# Patient Record
Sex: FEMALE | Race: WHITE | Employment: FULL TIME | URBAN - METROPOLITAN AREA
[De-identification: names, ages, dates, MRNs, and addresses within clinical notes are randomized per-mention and may not be internally consistent; named-entity substitution may affect disease eponyms.]

---

## 2017-01-23 ENCOUNTER — HOSPITAL ENCOUNTER (OUTPATIENT)
Dept: NUCLEAR MEDICINE | Age: 22
Discharge: HOME | End: 2017-01-23
Attending: SURGERY | Admitting: SURGERY

## 2017-01-23 DIAGNOSIS — K31.84 GASTROPARESIS: ICD-10-CM

## 2017-01-23 RX ADMIN — Medication 1.3 MILLICURIE: at 09:36

## 2017-06-26 ENCOUNTER — TELEPHONE (OUTPATIENT)
Dept: SURGERY | Age: 22
End: 2017-06-26

## 2017-06-26 ENCOUNTER — HOSPITAL ENCOUNTER (OUTPATIENT)
Dept: LAB | Age: 22
Discharge: HOME | End: 2017-06-26
Attending: SPECIALIST | Admitting: SPECIALIST

## 2017-06-26 DIAGNOSIS — R11.0 NAUSEA: Primary | ICD-10-CM

## 2017-06-26 LAB
ALBUMIN SERPL-MCNC: 4.9 GM/DL (ref 3.4–5)
ALP BLD-CCNC: 318 IU/L (ref 40–128)
ALT SERPL-CCNC: 790 U/L (ref 10–40)
AMYLASE: 37 U/L (ref 25–115)
ANION GAP SERPL CALCULATED.3IONS-SCNC: 19 MMOL/L (ref 4–16)
AST SERPL-CCNC: 443 IU/L (ref 15–37)
BILIRUB SERPL-MCNC: 5.4 MG/DL (ref 0–1)
BUN BLDV-MCNC: 10 MG/DL (ref 6–23)
CALCIUM SERPL-MCNC: 10.6 MG/DL (ref 8.3–10.6)
CHLORIDE BLD-SCNC: 94 MMOL/L (ref 99–110)
CO2: 24 MMOL/L (ref 21–32)
CREAT SERPL-MCNC: 0.9 MG/DL (ref 0.6–1.1)
GFR AFRICAN AMERICAN: >60 ML/MIN/1.73M2
GFR NON-AFRICAN AMERICAN: >60 ML/MIN/1.73M2
GLUCOSE BLD-MCNC: 89 MG/DL (ref 70–140)
HCT VFR BLD CALC: 49 % (ref 37–47)
HEMOGLOBIN: 17.2 GM/DL (ref 12.5–16)
INR BLD: 0.99 INDEX
LIPASE: 26 IU/L (ref 13–60)
MCH RBC QN AUTO: 31.8 PG (ref 27–31)
MCHC RBC AUTO-ENTMCNC: 35.1 % (ref 32–36)
MCV RBC AUTO: 90.6 FL (ref 78–100)
PDW BLD-RTO: 11.9 % (ref 11.7–14.9)
PLATELET # BLD: 310 K/CU MM (ref 140–440)
PMV BLD AUTO: 11 FL (ref 7.5–11.1)
POTASSIUM SERPL-SCNC: 4.4 MMOL/L (ref 3.5–5.1)
PROTHROMBIN TIME: 11.3 SECONDS (ref 9.12–12.5)
RBC # BLD: 5.41 M/CU MM (ref 4.2–5.4)
SODIUM BLD-SCNC: 137 MMOL/L (ref 135–145)
TOTAL PROTEIN: 8.1 GM/DL (ref 6.4–8.2)
WBC # BLD: 6.2 K/CU MM (ref 4–10.5)

## 2017-06-26 RX ORDER — SCOLOPAMINE TRANSDERMAL SYSTEM 1 MG/1
1 PATCH, EXTENDED RELEASE TRANSDERMAL
Qty: 5 PATCH | Refills: 0 | Status: ON HOLD | OUTPATIENT
Start: 2017-06-26 | End: 2017-06-29 | Stop reason: HOSPADM

## 2017-06-28 ENCOUNTER — HOSPITAL ENCOUNTER (OUTPATIENT)
Dept: GASTROENTEROLOGY | Age: 22
Discharge: HOME | End: 2017-07-27
Attending: SPECIALIST | Admitting: SPECIALIST

## 2017-06-28 PROBLEM — K80.51 CALCULUS OF BILE DUCT WITHOUT CHOLECYSTITIS WITH OBSTRUCTION: Status: ACTIVE | Noted: 2017-06-28

## 2017-06-28 LAB — PREGNANCY TEST URINE, POC: NEGATIVE

## 2019-02-20 ENCOUNTER — OFFICE VISIT (OUTPATIENT)
Dept: INTERNAL MEDICINE CLINIC | Age: 24
End: 2019-02-20
Payer: COMMERCIAL

## 2019-02-20 VITALS
WEIGHT: 181 LBS | OXYGEN SATURATION: 98 % | RESPIRATION RATE: 20 BRPM | SYSTOLIC BLOOD PRESSURE: 110 MMHG | HEART RATE: 111 BPM | DIASTOLIC BLOOD PRESSURE: 70 MMHG | HEIGHT: 69 IN | BODY MASS INDEX: 26.81 KG/M2

## 2019-02-20 DIAGNOSIS — F32.A DEPRESSION, UNSPECIFIED DEPRESSION TYPE: Primary | ICD-10-CM

## 2019-02-20 DIAGNOSIS — K52.9 CHRONIC DIARRHEA: ICD-10-CM

## 2019-02-20 DIAGNOSIS — K21.9 GASTROESOPHAGEAL REFLUX DISEASE WITHOUT ESOPHAGITIS: ICD-10-CM

## 2019-02-20 PROCEDURE — 99203 OFFICE O/P NEW LOW 30 MIN: CPT | Performed by: NURSE PRACTITIONER

## 2019-02-20 RX ORDER — CHOLESTYRAMINE 4 G/9G
2 POWDER, FOR SUSPENSION ORAL 2 TIMES DAILY WITH MEALS
Qty: 30 PACKET | Refills: 1 | Status: SHIPPED | OUTPATIENT
Start: 2019-02-20 | End: 2019-05-23 | Stop reason: SDUPTHER

## 2019-02-20 RX ORDER — CITALOPRAM 10 MG/1
10 TABLET ORAL DAILY
Qty: 30 TABLET | Refills: 2 | Status: SHIPPED | OUTPATIENT
Start: 2019-02-20 | End: 2019-05-23

## 2019-02-20 ASSESSMENT — ENCOUNTER SYMPTOMS
NAUSEA: 0
SINUS PAIN: 0
COUGH: 0
DIARRHEA: 1
ABDOMINAL PAIN: 0
SHORTNESS OF BREATH: 0
APNEA: 0
CHEST TIGHTNESS: 0
SINUS PRESSURE: 0
COLOR CHANGE: 0
VOMITING: 0

## 2019-02-20 ASSESSMENT — PATIENT HEALTH QUESTIONNAIRE - PHQ9
2. FEELING DOWN, DEPRESSED OR HOPELESS: 1
SUM OF ALL RESPONSES TO PHQ9 QUESTIONS 1 & 2: 2
SUM OF ALL RESPONSES TO PHQ QUESTIONS 1-9: 2
DEPRESSION UNABLE TO ASSESS: FUNCTIONAL CAPACITY MOTIVATION LIMITS ACCURACY
SUM OF ALL RESPONSES TO PHQ QUESTIONS 1-9: 2
1. LITTLE INTEREST OR PLEASURE IN DOING THINGS: 1

## 2019-04-16 ENCOUNTER — TELEPHONE (OUTPATIENT)
Dept: INTERNAL MEDICINE CLINIC | Age: 24
End: 2019-04-16

## 2019-04-16 DIAGNOSIS — T75.3XXA MOTION SICKNESS, INITIAL ENCOUNTER: Primary | ICD-10-CM

## 2019-04-16 RX ORDER — MECLIZINE HYDROCHLORIDE 25 MG/1
25 TABLET ORAL 2 TIMES DAILY PRN
Qty: 30 TABLET | Refills: 0 | Status: SHIPPED | OUTPATIENT
Start: 2019-04-16 | End: 2019-04-26

## 2019-04-16 NOTE — TELEPHONE ENCOUNTER
Would recommend patient try Meclizine 25 mg 2-3 times per day as needed. Should help. If not improving over next several days let me know. This was sent to her pharmacy for her just now.

## 2019-04-22 ENCOUNTER — TELEPHONE (OUTPATIENT)
Dept: INTERNAL MEDICINE CLINIC | Age: 24
End: 2019-04-22

## 2019-04-22 DIAGNOSIS — T75.3XXD MOTION SICKNESS, SUBSEQUENT ENCOUNTER: Primary | ICD-10-CM

## 2019-04-22 RX ORDER — PROMETHAZINE HYDROCHLORIDE 25 MG/1
12.5 TABLET ORAL EVERY 8 HOURS PRN
Qty: 20 TABLET | Refills: 0 | Status: SHIPPED | OUTPATIENT
Start: 2019-04-22 | End: 2019-04-29

## 2019-05-23 ENCOUNTER — OFFICE VISIT (OUTPATIENT)
Dept: INTERNAL MEDICINE CLINIC | Age: 24
End: 2019-05-23
Payer: COMMERCIAL

## 2019-05-23 VITALS
WEIGHT: 189.4 LBS | BODY MASS INDEX: 27.97 KG/M2 | OXYGEN SATURATION: 98 % | HEART RATE: 83 BPM | SYSTOLIC BLOOD PRESSURE: 122 MMHG | DIASTOLIC BLOOD PRESSURE: 82 MMHG

## 2019-05-23 DIAGNOSIS — K52.9 CHRONIC DIARRHEA: ICD-10-CM

## 2019-05-23 DIAGNOSIS — F32.A DEPRESSION, UNSPECIFIED DEPRESSION TYPE: ICD-10-CM

## 2019-05-23 PROCEDURE — 99213 OFFICE O/P EST LOW 20 MIN: CPT | Performed by: NURSE PRACTITIONER

## 2019-05-23 RX ORDER — CITALOPRAM 20 MG/1
20 TABLET ORAL DAILY
Qty: 30 TABLET | Refills: 2 | Status: SHIPPED | OUTPATIENT
Start: 2019-05-23 | End: 2019-09-03 | Stop reason: SDUPTHER

## 2019-05-23 RX ORDER — CHOLESTYRAMINE 4 G/9G
2 POWDER, FOR SUSPENSION ORAL 2 TIMES DAILY WITH MEALS
Qty: 30 PACKET | Refills: 1 | Status: SHIPPED | OUTPATIENT
Start: 2019-05-23 | End: 2019-12-19 | Stop reason: CLARIF

## 2019-05-23 ASSESSMENT — ENCOUNTER SYMPTOMS
SINUS PRESSURE: 0
COLOR CHANGE: 0
VOMITING: 0
NAUSEA: 0
EYES NEGATIVE: 1
SHORTNESS OF BREATH: 0
ABDOMINAL PAIN: 0
DIARRHEA: 1
APNEA: 0
SINUS PAIN: 0
CHEST TIGHTNESS: 0
COUGH: 0
ALLERGIC/IMMUNOLOGIC NEGATIVE: 1

## 2019-05-23 NOTE — PROGRESS NOTES
Exam   Constitutional: She is oriented to person, place, and time. She appears well-developed and well-nourished. No distress. HENT:   Head: Normocephalic and atraumatic. Eyes: Pupils are equal, round, and reactive to light. Conjunctivae and EOM are normal.   Neck: Normal range of motion. Neck supple. No muscular tenderness present. No edema and no erythema present. Cardiovascular: Normal rate, regular rhythm and normal heart sounds. No murmur heard. Pulmonary/Chest: Effort normal and breath sounds normal. No respiratory distress. Abdominal: Soft. Bowel sounds are normal. There is no tenderness. Musculoskeletal: Normal range of motion. She exhibits no edema. Neurological: She is alert and oriented to person, place, and time. Coordination normal.   Skin: Skin is warm and dry. Capillary refill takes less than 2 seconds. No rash noted. She is not diaphoretic. No erythema. Psychiatric: She has a normal mood and affect. Her behavior is normal. Judgment and thought content normal.       ASSESSMENT:    1. Depression, unspecified depression type    2. Chronic diarrhea        PLAN:    Paola Masters was seen today for 3 month follow-up. Diagnoses and all orders for this visit:    Depression, unspecified depression type- improving with Celexa over last 2 months, however, not quite at goal. Patient still with episodes of noticeable dysphoric mood. Will increase slightly to 20 mg daily and follow closely in 2-3 months for effect. -     citalopram (CELEXA) 20 MG tablet; Take 1 tablet by mouth daily    Chronic diarrhea- same as previous encounter, likely 2/2 being s/p shannon. -     cholestyramine (QUESTRAN) 4 GM/DOSE powder; Take 0.5 packets by mouth 2 times daily (with meals)    Course of treatment, including any medications, possible imaging, referrals, and follow ups discussed with patient.  All risks and benefits and possible side effects discussed with patient who agrees to plan of care and verbalizes understanding. All labs and imaging reviewed. No flowsheet data found. Return in about 3 months (around 8/23/2019).

## 2019-12-19 ENCOUNTER — OFFICE VISIT (OUTPATIENT)
Dept: INTERNAL MEDICINE CLINIC | Age: 24
End: 2019-12-19
Payer: COMMERCIAL

## 2019-12-19 VITALS
SYSTOLIC BLOOD PRESSURE: 112 MMHG | HEART RATE: 101 BPM | DIASTOLIC BLOOD PRESSURE: 78 MMHG | WEIGHT: 206.4 LBS | BODY MASS INDEX: 30.57 KG/M2 | OXYGEN SATURATION: 99 % | RESPIRATION RATE: 16 BRPM | HEIGHT: 69 IN

## 2019-12-19 DIAGNOSIS — F32.A ANXIETY AND DEPRESSION: Primary | ICD-10-CM

## 2019-12-19 DIAGNOSIS — F41.9 ANXIETY AND DEPRESSION: Primary | ICD-10-CM

## 2019-12-19 DIAGNOSIS — K21.9 GASTROESOPHAGEAL REFLUX DISEASE WITHOUT ESOPHAGITIS: ICD-10-CM

## 2019-12-19 PROCEDURE — 99214 OFFICE O/P EST MOD 30 MIN: CPT | Performed by: NURSE PRACTITIONER

## 2019-12-19 RX ORDER — CITALOPRAM 40 MG/1
40 TABLET ORAL DAILY
Qty: 30 TABLET | Refills: 3 | Status: SHIPPED | OUTPATIENT
Start: 2019-12-19 | End: 2020-07-08 | Stop reason: SDUPTHER

## 2019-12-19 RX ORDER — FAMOTIDINE 40 MG/1
40 TABLET, FILM COATED ORAL EVERY EVENING
Qty: 30 TABLET | Refills: 3 | Status: SHIPPED | OUTPATIENT
Start: 2019-12-19 | End: 2020-12-21 | Stop reason: SDUPTHER

## 2019-12-19 RX ORDER — HYDROXYZINE HYDROCHLORIDE 25 MG/1
25 TABLET, FILM COATED ORAL EVERY 8 HOURS PRN
Qty: 30 TABLET | Refills: 1 | Status: SHIPPED | OUTPATIENT
Start: 2019-12-19 | End: 2019-12-29

## 2019-12-19 ASSESSMENT — ENCOUNTER SYMPTOMS
APNEA: 0
VOMITING: 0
SHORTNESS OF BREATH: 0
SINUS PRESSURE: 0
SINUS PAIN: 0
DIARRHEA: 0
ABDOMINAL PAIN: 0
COLOR CHANGE: 0
COUGH: 0
NAUSEA: 0
CHEST TIGHTNESS: 0

## 2020-01-29 ENCOUNTER — OFFICE VISIT (OUTPATIENT)
Dept: INTERNAL MEDICINE CLINIC | Age: 25
End: 2020-01-29
Payer: COMMERCIAL

## 2020-01-29 VITALS
BODY MASS INDEX: 30.57 KG/M2 | SYSTOLIC BLOOD PRESSURE: 112 MMHG | WEIGHT: 207 LBS | TEMPERATURE: 98.6 F | OXYGEN SATURATION: 98 % | DIASTOLIC BLOOD PRESSURE: 78 MMHG | HEART RATE: 78 BPM

## 2020-01-29 PROCEDURE — 99213 OFFICE O/P EST LOW 20 MIN: CPT | Performed by: NURSE PRACTITIONER

## 2020-01-29 RX ORDER — PREDNISONE 10 MG/1
TABLET ORAL
Qty: 20 TABLET | Refills: 0 | Status: SHIPPED
Start: 2020-01-29 | End: 2020-03-19 | Stop reason: CLARIF

## 2020-01-29 RX ORDER — AMOXICILLIN 500 MG/1
500 CAPSULE ORAL 2 TIMES DAILY
Qty: 14 CAPSULE | Refills: 0 | Status: SHIPPED | OUTPATIENT
Start: 2020-01-29 | End: 2020-02-05

## 2020-01-29 ASSESSMENT — ENCOUNTER SYMPTOMS
COUGH: 1
VOMITING: 0
NAUSEA: 0
RHINORRHEA: 1
COLOR CHANGE: 0
APNEA: 0
SINUS PAIN: 1
SHORTNESS OF BREATH: 0
ABDOMINAL PAIN: 0
SINUS PRESSURE: 1
DIARRHEA: 0
CHEST TIGHTNESS: 0

## 2020-01-29 NOTE — PROGRESS NOTES
Kailey Rodriguez  1995  01/29/20    Chief Complaint   Patient presents with    Cough     all symptoms x 2 days    Congestion    Pharyngitis       SUBJECTIVE:      Gerson Christianson presents to the office this morning for c/o worsening nasal congestion, sinus pain/pressure, mild pharyngitis, and a cough over the last several days. Denies any nausea, emesis, diarrhea, or other acute GI/ distress. No recent fevers or chills. She has been using alkaseltzer cold medicine OTC with minimal benefit. Denies any known recent sick contacts. No fatigue, body aches, or measurable fevers. Review of Systems   Constitutional: Negative for activity change, appetite change, fatigue and fever. HENT: Positive for congestion, postnasal drip, rhinorrhea, sinus pressure and sinus pain. Negative for nosebleeds. Respiratory: Positive for cough. Negative for apnea, chest tightness and shortness of breath. Cardiovascular: Negative for chest pain and palpitations. Gastrointestinal: Negative for abdominal pain, diarrhea, nausea and vomiting. Genitourinary: Negative for difficulty urinating, flank pain and hematuria. Musculoskeletal: Negative for arthralgias, joint swelling and myalgias. Skin: Negative for color change and rash. Neurological: Negative for dizziness, light-headedness and headaches. Psychiatric/Behavioral: Negative. Negative for behavioral problems. OBJECTIVE:    /78 (Site: Left Upper Arm, Position: Sitting, Cuff Size: Medium Adult)   Pulse 78   Temp 98.6 °F (37 °C)   Wt 207 lb (93.9 kg)   SpO2 98%   BMI 30.57 kg/m²     Physical Exam  Constitutional:       General: She is not in acute distress. Appearance: She is well-developed. She is not diaphoretic. HENT:      Head: Normocephalic and atraumatic. Right Ear: External ear normal.      Left Ear: External ear normal.      Nose: Mucosal edema and rhinorrhea present.       Mouth/Throat:      Pharynx: No oropharyngeal exudate or posterior oropharyngeal erythema. Neck:      Musculoskeletal: Normal range of motion and neck supple. No edema, erythema or muscular tenderness. Cardiovascular:      Rate and Rhythm: Normal rate and regular rhythm. Heart sounds: No murmur. Pulmonary:      Effort: Pulmonary effort is normal. No respiratory distress. Breath sounds: Normal breath sounds. No wheezing. Abdominal:      General: Bowel sounds are normal.      Palpations: Abdomen is soft. Tenderness: There is no abdominal tenderness. Musculoskeletal: Normal range of motion. Skin:     General: Skin is warm and dry. Capillary Refill: Capillary refill takes less than 2 seconds. Findings: No rash. Neurological:      Mental Status: She is alert and oriented to person, place, and time. Coordination: Coordination normal.         ASSESSMENT:    1. Acute non-recurrent maxillary sinusitis        PLAN:    Cisco Ahumada was seen today for cough, congestion and pharyngitis. Diagnoses and all orders for this visit:    Acute non-recurrent maxillary sinusitis- suspect likely viral at this time. Pt will start with pred taper, however, since will be out of town soon on vacation, she will start Amox if not improving in 3-4 days. -     amoxicillin (AMOXIL) 500 MG capsule; Take 1 capsule by mouth 2 times daily for 7 days  -     predniSONE (DELTASONE) 10 MG tablet; Take 4 tablets daily for 2 days, then 3 tablets for 2 days, 2 tablets for 2 days, then 1 tablet for 2 days    Course of treatment, including any medications, possible imaging, referrals, and follow ups discussed with patient. All risks and benefits and possible side effects discussed with patient who agrees to plan of care and verbalizes understanding. All labs and imaging reviewed. No flowsheet data found. Return if symptoms worsen or fail to improve.

## 2020-03-19 ENCOUNTER — OFFICE VISIT (OUTPATIENT)
Dept: INTERNAL MEDICINE CLINIC | Age: 25
End: 2020-03-19
Payer: COMMERCIAL

## 2020-03-19 VITALS
HEART RATE: 69 BPM | SYSTOLIC BLOOD PRESSURE: 109 MMHG | BODY MASS INDEX: 28.58 KG/M2 | OXYGEN SATURATION: 98 % | DIASTOLIC BLOOD PRESSURE: 71 MMHG | HEIGHT: 69 IN | WEIGHT: 193 LBS | RESPIRATION RATE: 16 BRPM

## 2020-03-19 LAB
A/G RATIO: 1.8 (ref 1.1–2.2)
ALBUMIN SERPL-MCNC: 4.5 G/DL (ref 3.4–5)
ALP BLD-CCNC: 98 U/L (ref 40–129)
ALT SERPL-CCNC: 16 U/L (ref 10–40)
ANION GAP SERPL CALCULATED.3IONS-SCNC: 17 MMOL/L (ref 3–16)
AST SERPL-CCNC: 16 U/L (ref 15–37)
BASOPHILS ABSOLUTE: 0 K/UL (ref 0–0.2)
BASOPHILS RELATIVE PERCENT: 0.6 %
BILIRUB SERPL-MCNC: 0.4 MG/DL (ref 0–1)
BUN BLDV-MCNC: 14 MG/DL (ref 7–20)
CALCIUM SERPL-MCNC: 9.6 MG/DL (ref 8.3–10.6)
CHLORIDE BLD-SCNC: 103 MMOL/L (ref 99–110)
CHOLESTEROL, FASTING: 164 MG/DL (ref 0–199)
CO2: 23 MMOL/L (ref 21–32)
CREAT SERPL-MCNC: 0.7 MG/DL (ref 0.6–1.1)
EOSINOPHILS ABSOLUTE: 0 K/UL (ref 0–0.6)
EOSINOPHILS RELATIVE PERCENT: 0.6 %
GFR AFRICAN AMERICAN: >60
GFR NON-AFRICAN AMERICAN: >60
GLOBULIN: 2.5 G/DL
GLUCOSE BLD-MCNC: 86 MG/DL (ref 70–99)
HCT VFR BLD CALC: 42.5 % (ref 36–48)
HDLC SERPL-MCNC: 34 MG/DL (ref 40–60)
HEMOGLOBIN: 14.6 G/DL (ref 12–16)
LDL CHOLESTEROL CALCULATED: 102 MG/DL
LYMPHOCYTES ABSOLUTE: 1.7 K/UL (ref 1–5.1)
LYMPHOCYTES RELATIVE PERCENT: 36.3 %
MCH RBC QN AUTO: 30.9 PG (ref 26–34)
MCHC RBC AUTO-ENTMCNC: 34.2 G/DL (ref 31–36)
MCV RBC AUTO: 90.2 FL (ref 80–100)
MONOCYTES ABSOLUTE: 0.3 K/UL (ref 0–1.3)
MONOCYTES RELATIVE PERCENT: 6.6 %
NEUTROPHILS ABSOLUTE: 2.7 K/UL (ref 1.7–7.7)
NEUTROPHILS RELATIVE PERCENT: 55.9 %
PDW BLD-RTO: 13.5 % (ref 12.4–15.4)
PLATELET # BLD: 229 K/UL (ref 135–450)
PMV BLD AUTO: 9.7 FL (ref 5–10.5)
POTASSIUM SERPL-SCNC: 4.6 MMOL/L (ref 3.5–5.1)
RBC # BLD: 4.72 M/UL (ref 4–5.2)
SODIUM BLD-SCNC: 143 MMOL/L (ref 136–145)
TOTAL PROTEIN: 7 G/DL (ref 6.4–8.2)
TRIGLYCERIDE, FASTING: 139 MG/DL (ref 0–150)
TSH REFLEX: 1.62 UIU/ML (ref 0.27–4.2)
VLDLC SERPL CALC-MCNC: 28 MG/DL
WBC # BLD: 4.8 K/UL (ref 4–11)

## 2020-03-19 PROCEDURE — 99395 PREV VISIT EST AGE 18-39: CPT | Performed by: NURSE PRACTITIONER

## 2020-03-19 ASSESSMENT — ENCOUNTER SYMPTOMS
SINUS PAIN: 0
COLOR CHANGE: 0
APNEA: 0
DIARRHEA: 0
VOMITING: 0
CHEST TIGHTNESS: 0
COUGH: 0
SINUS PRESSURE: 0
SHORTNESS OF BREATH: 0
ABDOMINAL PAIN: 0
NAUSEA: 0

## 2020-03-19 NOTE — PROGRESS NOTES
3/19/2020    Lilly Guzman (:  1995) is a 21 y.o. female, here for a preventive medicine evaluation. Patient Active Problem List   Diagnosis    Calculus of bile duct without cholecystitis with obstruction    GERD (gastroesophageal reflux disease)    Anxiety and depression     Patient is also here for 3 month follow up of her anxiety and depression    Patient's depression and anxiety are reasonably well controlled with current treatment. Patient denies any suicidal ideation, homicidal ideation, hallucinations. Her Celexa was increased to 40 mg x 3 months ago with the addition of Hydroxyzine which she is requiring sparingly. Overall, she is satisfied with her mood since the Celexa increase to 40 mg. Patient's reflux well controlled on current medications. Patient denies any blood in stool black stool, heartburn, reflux. Denies issues with frequent coughing or reflux while sleeping. Able to eat and drink appropriately without complications.    -Declines HIV Screen  -PAP is not UTD, but she is establishing with OBGYN soon. Review of Systems   Constitutional: Negative for activity change, appetite change, fatigue and fever. HENT: Negative for congestion, nosebleeds, sinus pressure and sinus pain. Respiratory: Negative for apnea, cough, chest tightness and shortness of breath. Cardiovascular: Negative for chest pain and palpitations. Gastrointestinal: Negative for abdominal pain, diarrhea, nausea and vomiting. Genitourinary: Negative for difficulty urinating, flank pain and hematuria. Musculoskeletal: Negative for arthralgias, joint swelling and myalgias. Skin: Negative for color change and rash. Neurological: Negative for dizziness, light-headedness and headaches. Psychiatric/Behavioral: Negative. Negative for behavioral problems. Prior to Visit Medications    Medication Sig Taking?  Authorizing Provider   famotidine (PEPCID) 40 MG tablet Take 1 tablet by mouth History Narrative    Not on file        Family History   Problem Relation Age of Onset    Diabetes Mother     High Blood Pressure Father        ADVANCE DIRECTIVE: N, Not Received    Vitals:    03/19/20 0806   BP: 109/71   Pulse: 69   Resp: 16   SpO2: 98%   Weight: 193 lb (87.5 kg)   Height: 5' 9\" (1.753 m)     Estimated body mass index is 28.5 kg/m² as calculated from the following:    Height as of this encounter: 5' 9\" (1.753 m). Weight as of this encounter: 193 lb (87.5 kg). Physical Exam  Constitutional:       General: She is not in acute distress. Appearance: She is well-developed. She is not diaphoretic. HENT:      Head: Normocephalic and atraumatic. Nose: Nose normal.      Mouth/Throat:      Pharynx: No oropharyngeal exudate. Eyes:      Conjunctiva/sclera: Conjunctivae normal.      Pupils: Pupils are equal, round, and reactive to light. Neck:      Musculoskeletal: Normal range of motion and neck supple. No edema, erythema or muscular tenderness. Cardiovascular:      Rate and Rhythm: Normal rate and regular rhythm. Heart sounds: Normal heart sounds. No murmur. No friction rub. Pulmonary:      Effort: Pulmonary effort is normal. No respiratory distress. Breath sounds: Normal breath sounds. Abdominal:      General: Bowel sounds are normal.      Palpations: Abdomen is soft. Tenderness: There is no abdominal tenderness. Musculoskeletal: Normal range of motion. Skin:     General: Skin is warm and dry. Capillary Refill: Capillary refill takes less than 2 seconds. Findings: No erythema or rash. Neurological:      Mental Status: She is alert and oriented to person, place, and time. Cranial Nerves: No cranial nerve deficit. Coordination: Coordination normal.      Deep Tendon Reflexes: Reflexes normal.   Psychiatric:         Behavior: Behavior normal.         Thought Content:  Thought content normal.         Judgment: Judgment normal.         No flowsheet data found. Lab Results   Component Value Date    GLUF 125 10/11/2016    GLUCOSE 90 06/29/2017       The ASCVD Risk score (Trey Smith., et al., 2013) failed to calculate for the following reasons: The 2013 ASCVD risk score is only valid for ages 36 to 78    Immunization History   Administered Date(s) Administered    Influenza Virus Vaccine 11/20/2018, 12/01/2019    Tdap (Boostrix, Adacel) 10/20/2017       Health Maintenance   Topic Date Due    HPV vaccine (1 - 2-dose series) 10/16/2005    HIV screen  10/16/2009    Hepatitis A vaccine (2 of 2 - 2-dose series) 05/10/2010    Cervical cancer screen  10/16/2015    Varicella vaccine (1 of 2 - 2-dose childhood series) 04/09/2020 (Originally 11/28/2011)    DTaP/Tdap/Td vaccine (2 - Td) 10/20/2027    Shingles Vaccine (1 of 2) 10/16/2044    Meningococcal (ACWY) vaccine  Completed    Flu vaccine  Completed    Hepatitis B vaccine  Aged Out    Hib vaccine  Aged Out    Pneumococcal 0-64 years Vaccine  Aged Out       ASSESSMENT/PLAN:  1. Encounter for preventive health examination- no acute concerns on exam; BP and all other vitals are at goal; will check preventative labs and per health screening requirement for employer [employer]. Will address any concerns as found. Also did encourage pt to establish with OBGYN for routine exams and if unable/unwilling, she can let us know to schedule a cervical cancer screen. - CBC Auto Differential; Future  - Comprehensive Metabolic Panel; Future  - Lipid, Fasting; Future  - TSH with Reflex; Future    2. Anxiety and depression- much improved on increased Celexa 40 mg dose and using Hydroxyzine minimally. If symptoms worsen, we can consider adding Buspar down the road. - TSH with Reflex; Future    3. Gastroesophageal reflux disease without esophagitis - well controlled; no changes in management at this time.      Course of treatment, including any medications, possible imaging, referrals, and follow ups discussed with patient. All risks and benefits and possible side effects discussed with patient who agrees to plan of care and verbalizes understanding. All labs and imaging reviewed. Return in about 6 months (around 9/19/2020). An electronic signature was used to authenticate this note.     --ELIZABETH Huynh - CNP on 3/19/2020 at 8:49 AM

## 2020-08-25 ENCOUNTER — OFFICE VISIT (OUTPATIENT)
Dept: INTERNAL MEDICINE CLINIC | Age: 25
End: 2020-08-25
Payer: COMMERCIAL

## 2020-08-25 VITALS
HEIGHT: 69 IN | HEART RATE: 95 BPM | OXYGEN SATURATION: 99 % | WEIGHT: 188.8 LBS | RESPIRATION RATE: 18 BRPM | BODY MASS INDEX: 27.96 KG/M2 | SYSTOLIC BLOOD PRESSURE: 111 MMHG | DIASTOLIC BLOOD PRESSURE: 71 MMHG

## 2020-08-25 PROCEDURE — 99214 OFFICE O/P EST MOD 30 MIN: CPT | Performed by: NURSE PRACTITIONER

## 2020-08-25 RX ORDER — BUPROPION HYDROCHLORIDE 100 MG/1
100 TABLET, EXTENDED RELEASE ORAL DAILY
Qty: 30 TABLET | Refills: 1 | Status: SHIPPED | OUTPATIENT
Start: 2020-08-25 | End: 2020-11-03 | Stop reason: SDUPTHER

## 2020-08-25 ASSESSMENT — ENCOUNTER SYMPTOMS
NAUSEA: 0
COUGH: 0
ABDOMINAL PAIN: 0
VOMITING: 0
SINUS PRESSURE: 0
COLOR CHANGE: 0
DIARRHEA: 0
SINUS PAIN: 0
CHEST TIGHTNESS: 0
SHORTNESS OF BREATH: 0
APNEA: 0

## 2020-08-25 NOTE — PROGRESS NOTES
Margarita Narayan  1995  08/25/20    Chief Complaint   Patient presents with    6 Month Follow-Up    Swelling     on the right side of face, neck pain and fatigue sx started 4 days ago        SUBJECTIVE:      Patient's depression is reasonably well controlled with current treatment. Patient denies any suicidal ideation, homicidal ideation, hallucinations. Overall, the patient feels her depression is Fairly well controlled, however, still with some mild intermittent anhedonia and fatigue. She also endorses concern because since being on this medication she has had significantly decreased libido. Denies any SI or HI. Patient's reflux well controlled on current medications. Patient denies any blood in stool black stool, heartburn, reflux. Denies issues with frequent coughing or reflux while sleeping. Able to eat and drink appropriately without complications. Review of Systems   Constitutional: Negative for activity change, appetite change, fatigue and fever. HENT: Negative for congestion, nosebleeds, sinus pressure and sinus pain. Respiratory: Negative for apnea, cough, chest tightness and shortness of breath. Cardiovascular: Negative for chest pain and palpitations. Gastrointestinal: Negative for abdominal pain, diarrhea, nausea and vomiting. Genitourinary: Negative for difficulty urinating, flank pain and hematuria. Musculoskeletal: Negative for arthralgias, joint swelling and myalgias. Skin: Negative for color change and rash. Neurological: Negative for dizziness, light-headedness and headaches. Psychiatric/Behavioral: Positive for dysphoric mood. Negative for behavioral problems, self-injury and sleep disturbance. The patient is not nervous/anxious.          Decreased libido       OBJECTIVE:    /71   Pulse 95   Resp 18   Ht 5' 9\" (1.753 m)   Wt 188 lb 12.8 oz (85.6 kg)   SpO2 99%   BMI 27.88 kg/m²     Physical Exam  Constitutional:       General: She is not in acute distress. Appearance: She is well-developed. She is not diaphoretic. HENT:      Head: Normocephalic and atraumatic. Neck:      Musculoskeletal: Normal range of motion and neck supple. No edema, erythema or muscular tenderness. Cardiovascular:      Rate and Rhythm: Normal rate and regular rhythm. Heart sounds: Normal heart sounds. No murmur. No friction rub. Pulmonary:      Effort: Pulmonary effort is normal. No respiratory distress. Breath sounds: Normal breath sounds. Abdominal:      General: Bowel sounds are normal.      Palpations: Abdomen is soft. Tenderness: There is no abdominal tenderness. Musculoskeletal: Normal range of motion. Skin:     General: Skin is warm and dry. Capillary Refill: Capillary refill takes less than 2 seconds. Neurological:      Mental Status: She is alert and oriented to person, place, and time. Coordination: Coordination normal.   Psychiatric:         Behavior: Behavior normal.         Thought Content: Thought content normal.         Judgment: Judgment normal.         ASSESSMENT:    1. Anxiety and depression    2. Gastroesophageal reflux disease without esophagitis        PLAN:    Lacey Colby was seen today for 6 month follow-up and swelling. Diagnoses and all orders for this visit:    Anxiety and depression- still not quite at goal and patient noticing sexual side effects; will wean off of med slowly over next 2 weeks then pt to start Wellbutrin at 100 mg daily. Follow up at 6 weeks of being on new med and will re-assess/titrate to goal.   -     Ambulatory referral to Psychology  -     buPROPion [facility] SR) 100 MG extended release tablet; Take 1 tablet by mouth daily    Gastroesophageal reflux disease without esophagitis - well controlled; no changes in management at this time. Course of treatment, including any medications, possible imaging, referrals, and follow ups discussed with patient.  All risks and benefits and possible side effects discussed with patient who agrees to plan of care and verbalizes understanding. All labs and imaging reviewed. No flowsheet data found. Return in about 2 months (around 10/25/2020).

## 2020-11-12 ENCOUNTER — OFFICE VISIT (OUTPATIENT)
Dept: PRIMARY CARE CLINIC | Age: 25
End: 2020-11-12
Payer: COMMERCIAL

## 2020-11-12 ENCOUNTER — HOSPITAL ENCOUNTER (OUTPATIENT)
Age: 25
Setting detail: SPECIMEN
Discharge: HOME | End: 2020-11-12
Payer: COMMERCIAL

## 2020-11-12 VITALS — TEMPERATURE: 97.4 F

## 2020-11-12 PROCEDURE — 99213 OFFICE O/P EST LOW 20 MIN: CPT | Performed by: PHYSICIAN ASSISTANT

## 2020-11-12 PROCEDURE — U0002 COVID-19 LAB TEST NON-CDC: HCPCS

## 2020-11-12 NOTE — PATIENT INSTRUCTIONS
Your COVID 19 test can take 3-5 days for the results come back. We ask that you make a Mychart page and view your test results this way. You will need to Self quarantine until you know your results. Increase fluids rest  Saline nasal spray as directed  Warm salt gargles for throat discomfort  Monitor temperature twice a day  Tylenol for fevers and/or discomfort. If symptoms are worse -Go to the ER. Follow up with your primary doctor    To Whom it May Concern:    Patricia Lyons has been tested for COVID on 11/12/20. They may NOT return to work until their lab test results back and they been fever free for 3 days. If test is positive they must stay home for 2 weeks or until they test negative or as directed by the [facility] Department.

## 2020-11-12 NOTE — PROGRESS NOTES
11/12/20  Bala Garcia  1995    FLU/COVID-19 CLINIC EVALUATION    HPI SYMPTOMS:    Employer: [employer]    [] Fevers  [] Chills  [] Cough  [] Coughing up blood  [] Chest Congestion  [x] Nasal Congestion  [] Feeling short of breath  [] Sometimes  [] Frequently  [] All the time  [] Chest pain  [x] Headaches  [x]Tolerable  [] Severe  [] Sore throat  [x] Muscle aches  [x] Nausea  [] Vomiting  []Unable to keep fluids down  [] Diarrhea  []Severe    [x] OTHER SYMPTOMS: FATIGUE      Symptom Duration:   [x] 1  [] 2   [] 3   [] 4    [] 5   [] 6   [] 7   [] 8   [] 9   [] 10   [] 11   [] 12   [] 13   [] 14   [] Longer than 14 days    Symptom course:   [] Worsening     [x] Stable     [] Improving    RISK FACTORS:    [] Pregnant or possibly pregnant  [] Age over 61  [] Diabetes  [] Heart disease  [] Asthma  [] COPD/Other chronic lung diseases  [] Active Cancer  [] On Chemotherapy  [] Taking oral steroids  [] History Lymphoma/Leukemia  [] Close contact with a lab confirmed COVID-19 patient within 14 days of symptom onset  [] History of travel from affected geographical areas within 14 days of symptom onset       VITALS:  There were no vitals filed for this visit. TESTS:    POCT FLU:  [] Positive     []Negative    ASSESSMENT:    [] Flu  [] Possible COVID-19  [] Strep    PLAN:    [] Discharge home with written instructions for:  [] Flu management  [] Possible COVID-19 infection with self-quarantine and management of symptoms  [] Follow-up with primary care physician or emergency department if worsens  [] Evaluation per physician/NP/PA in clinic  [] Sent to ER       An  electronic signature was used to authenticate this note.      --Adilene Land MA on 11/12/2020 at 1:09 PM

## 2020-11-12 NOTE — PROGRESS NOTES
11/12/2020    HPI:  Chief complaint and history of present illness as per medical assistant/nurse documented today in the Flu/COVID-19 clinic.      2-hour history of nasal congestion, mild frontal headache, generalized body aches, nausea, and fatigue. She denies chest pain, shortness of breath, fever, chills, cough, vomiting or diarrhea. She has not tried any over-the-counter medications for her symptoms. No known exposure to COVID-19. MEDICATIONS:  Prior to Visit Medications    Medication Sig Taking? Authorizing Provider   buPROPion [facility] SR) 100 MG extended release tablet Take 1 tablet by mouth daily  Gina Verde MD   famotidine (PEPCID) 40 MG tablet Take 1 tablet by mouth every evening  ELIZABETH Mendosa - CNP       No Known Allergies,   Past Medical History:   Diagnosis Date    Common bile duct stone     per old chart pt had gallbladder removed 1/2017-  had MRCP at [facility] 6/23/2017- dx with distal CBD stone- for ERCP 6/28/2017    Depression     GERD (gastroesophageal reflux disease)        PHYSICAL EXAM:  Physical Exam  Temp:  97.4 F  Heart Rate:  97    Pulse Ox:  99%    Constitutional:  Well developed, well nourished  HENT:  Normocephalic, atraumatic, bilateral external ears normal, bilateral TMs normal, oropharynx moist, nose normal  Eyes:  conjunctiva normal, no discharge, no scleral icterus  Cardiovascular:  Normal heart rate, normal rhythm, no murmurs, gallops or rubs  Thorax & Lungs:  Normal breath sounds, no respiratory distress, no wheezing  Skin:  Warm, dry, no erythema, no rash  Neurologic:  Alert & oriented   Psychiatric:  Affect normal, mood normal    ASSESSMENT/PLAN:  1. Muscle ache  - COVID-19 Ambulatory    2. Nausea  - COVID-19 Ambulatory      3. Nasal congestion  Patient was encouraged to rest and stay well-hydrated. Continue use of over-the-counter medications as needed for symptom relief. Isolation measures discussed.   ED for any sudden changes      FOLLOW-UP:  No follow-ups on file.     In addition to other information, the printed after visit summary provided to the patient includes:  [x] COVID-19 Self care instructions  [x] COVID-19 General patient information    [facility]

## 2020-11-14 LAB
SARS-COV-2: NOT DETECTED
SOURCE: NORMAL

## 2020-11-18 ENCOUNTER — OFFICE VISIT (OUTPATIENT)
Dept: PRIMARY CARE CLINIC | Age: 25
End: 2020-11-18
Payer: COMMERCIAL

## 2020-11-18 ENCOUNTER — HOSPITAL ENCOUNTER (OUTPATIENT)
Age: 25
Setting detail: SPECIMEN
Discharge: HOME | End: 2020-11-18
Payer: COMMERCIAL

## 2020-11-18 VITALS — HEART RATE: 92 BPM | OXYGEN SATURATION: 100 % | TEMPERATURE: 97.3 F

## 2020-11-18 PROCEDURE — 99213 OFFICE O/P EST LOW 20 MIN: CPT | Performed by: NURSE PRACTITIONER

## 2020-11-18 PROCEDURE — U0002 COVID-19 LAB TEST NON-CDC: HCPCS

## 2020-11-18 NOTE — PATIENT INSTRUCTIONS
Your COVID 19 test can take 3-5 days for the results come back. We ask that you make a Mychart page and view your test results this way. You will need to Self quarantine until you know your results. Increase fluids rest  Saline nasal spray as directed  Warm salt gargles for throat discomfort  Monitor temperature twice a day  Tylenol for fevers and/or discomfort. If symptoms are worse -Go to the ER. Follow up with your primary doctor    To Whom it May Concern:    Alexandrea Sawyer has been tested for COVID on 11/18/20. They may NOT return to work until their lab test results back and they been fever free for 3 days. If test is positive they must stay home for 2 weeks or until they test negative or as directed by the [facility] Department.

## 2020-11-18 NOTE — PROGRESS NOTES
11/18/2020    HPI:  Chief complaint and history of present illness as per medical assistant/nurse documented today in the Flu/COVID-19 clinic. MEDICATIONS:  Prior to Visit Medications    Medication Sig Taking? Authorizing Provider   buPROPion [facility] SR) 100 MG extended release tablet Take 1 tablet by mouth daily  Mireya Beaver MD   famotidine (PEPCID) 40 MG tablet Take 1 tablet by mouth every evening  Juancho Ponce, APRN - CNP       No Known Allergies,   Past Medical History:   Diagnosis Date    Common bile duct stone     per old chart pt had gallbladder removed 1/2017-  had MRCP at [facility] 6/23/2017- dx with distal CBD stone- for ERCP 6/28/2017    Depression     GERD (gastroesophageal reflux disease)    ,   Past Surgical History:   Procedure Laterality Date    CHOLECYSTECTOMY  01/2017    ERCP  06/28/2017    removal of CBD stone   ,   Social History     Tobacco Use    Smoking status: Never Smoker    Smokeless tobacco: Never Used   Substance Use Topics    Alcohol use: Yes     Comment: occasionally     Drug use: No   ,   Family History   Problem Relation Age of Onset    Diabetes Mother     High Blood Pressure Father    ,   Immunization History   Administered Date(s) Administered    Influenza Virus Vaccine 11/20/2018, 12/01/2019    Tdap (Boostrix, Adacel) 10/20/2017   ,   Health Maintenance   Topic Date Due    HPV vaccine (1 - 2-dose series) 10/16/2005    HIV screen  10/16/2009    Hepatitis A vaccine (2 of 2 - 2-dose series) 05/10/2010    Varicella vaccine (1 of 2 - 2-dose childhood series) 11/28/2011    Cervical cancer screen  10/16/2015    Flu vaccine (1) 09/01/2020    DTaP/Tdap/Td vaccine (2 - Td) 10/20/2027    Meningococcal (ACWY) vaccine  Completed    Hepatitis B vaccine  Aged Out    Hib vaccine  Aged Out    Pneumococcal 0-64 years Vaccine  Aged Out       PHYSICAL EXAM:  Physical Exam  Constitutional:       Appearance: Normal appearance. HENT:      Head: Normocephalic.

## 2020-11-21 ENCOUNTER — HOSPITAL ENCOUNTER (EMERGENCY)
Age: 25
Discharge: HOME | End: 2020-11-22
Payer: COMMERCIAL

## 2020-11-21 ENCOUNTER — APPOINTMENT (OUTPATIENT)
Dept: GENERAL RADIOLOGY | Age: 25
End: 2020-11-21
Payer: COMMERCIAL

## 2020-11-21 LAB
ALBUMIN SERPL-MCNC: 4.2 GM/DL (ref 3.4–5)
ALP BLD-CCNC: 94 IU/L (ref 40–129)
ALT SERPL-CCNC: 18 U/L (ref 10–40)
ANION GAP SERPL CALCULATED.3IONS-SCNC: 12 MMOL/L (ref 4–16)
AST SERPL-CCNC: 22 IU/L (ref 15–37)
BASOPHILS ABSOLUTE: 0.1 K/CU MM
BASOPHILS RELATIVE PERCENT: 0.7 % (ref 0–1)
BILIRUB SERPL-MCNC: 0.3 MG/DL (ref 0–1)
BUN BLDV-MCNC: 11 MG/DL (ref 6–23)
CALCIUM SERPL-MCNC: 8.9 MG/DL (ref 8.3–10.6)
CHLORIDE BLD-SCNC: 100 MMOL/L (ref 99–110)
CO2: 24 MMOL/L (ref 21–32)
CREAT SERPL-MCNC: 0.9 MG/DL (ref 0.6–1.1)
DIFFERENTIAL TYPE: ABNORMAL
EOSINOPHILS ABSOLUTE: 0 K/CU MM
EOSINOPHILS RELATIVE PERCENT: 0.4 % (ref 0–3)
GFR AFRICAN AMERICAN: >60 ML/MIN/1.73M2
GFR NON-AFRICAN AMERICAN: >60 ML/MIN/1.73M2
GLUCOSE BLD-MCNC: 87 MG/DL (ref 70–99)
HCT VFR BLD CALC: 42.6 % (ref 37–47)
HEMOGLOBIN: 15 GM/DL (ref 12.5–16)
IMMATURE NEUTROPHIL %: 0.4 % (ref 0–0.43)
LIPASE: 20 IU/L (ref 13–60)
LYMPHOCYTES ABSOLUTE: 1.5 K/CU MM
LYMPHOCYTES RELATIVE PERCENT: 22 % (ref 24–44)
MCH RBC QN AUTO: 31.2 PG (ref 27–31)
MCHC RBC AUTO-ENTMCNC: 35.2 % (ref 32–36)
MCV RBC AUTO: 88.6 FL (ref 78–100)
MONOCYTES ABSOLUTE: 0.5 K/CU MM
MONOCYTES RELATIVE PERCENT: 6.6 % (ref 0–4)
NUCLEATED RBC %: 0 %
PDW BLD-RTO: 11.9 % (ref 11.7–14.9)
PLATELET # BLD: 262 K/CU MM (ref 140–440)
PMV BLD AUTO: 10.1 FL (ref 7.5–11.1)
POTASSIUM SERPL-SCNC: 4 MMOL/L (ref 3.5–5.1)
PRO-BNP: 13.49 PG/ML
RBC # BLD: 4.81 M/CU MM (ref 4.2–5.4)
SARS-COV-2: NOT DETECTED
SEGMENTED NEUTROPHILS ABSOLUTE COUNT: 4.8 K/CU MM
SEGMENTED NEUTROPHILS RELATIVE PERCENT: 69.9 % (ref 36–66)
SODIUM BLD-SCNC: 136 MMOL/L (ref 135–145)
SOURCE: NORMAL
TOTAL IMMATURE NEUTOROPHIL: 0.03 K/CU MM
TOTAL NUCLEATED RBC: 0 K/CU MM
TOTAL PROTEIN: 6.9 GM/DL (ref 6.4–8.2)
TROPONIN T: <0.01 NG/ML
WBC # BLD: 6.8 K/CU MM (ref 4–10.5)

## 2020-11-21 PROCEDURE — 84484 ASSAY OF TROPONIN QUANT: CPT

## 2020-11-21 PROCEDURE — 80053 COMPREHEN METABOLIC PANEL: CPT

## 2020-11-21 PROCEDURE — 83690 ASSAY OF LIPASE: CPT

## 2020-11-21 PROCEDURE — 96361 HYDRATE IV INFUSION ADD-ON: CPT

## 2020-11-21 PROCEDURE — 96375 TX/PRO/DX INJ NEW DRUG ADDON: CPT

## 2020-11-21 PROCEDURE — 83880 ASSAY OF NATRIURETIC PEPTIDE: CPT

## 2020-11-21 PROCEDURE — 96374 THER/PROPH/DIAG INJ IV PUSH: CPT

## 2020-11-21 PROCEDURE — 93005 ELECTROCARDIOGRAM TRACING: CPT | Performed by: PHYSICIAN ASSISTANT

## 2020-11-21 PROCEDURE — 99285 EMERGENCY DEPT VISIT HI MDM: CPT

## 2020-11-21 PROCEDURE — 71045 X-RAY EXAM CHEST 1 VIEW: CPT

## 2020-11-21 PROCEDURE — 85025 COMPLETE CBC W/AUTO DIFF WBC: CPT

## 2020-11-21 RX ORDER — 0.9 % SODIUM CHLORIDE 0.9 %
1000 INTRAVENOUS SOLUTION INTRAVENOUS ONCE
Status: COMPLETED | OUTPATIENT
Start: 2020-11-21 | End: 2020-11-22

## 2020-11-21 RX ORDER — KETOROLAC TROMETHAMINE 30 MG/ML
15 INJECTION, SOLUTION INTRAMUSCULAR; INTRAVENOUS ONCE
Status: COMPLETED | OUTPATIENT
Start: 2020-11-21 | End: 2020-11-22

## 2020-11-21 ASSESSMENT — PAIN DESCRIPTION - LOCATION: LOCATION: CHEST

## 2020-11-21 ASSESSMENT — PAIN DESCRIPTION - PAIN TYPE: TYPE: ACUTE PAIN

## 2020-11-21 ASSESSMENT — PAIN SCALES - GENERAL: PAINLEVEL_OUTOF10: 3

## 2020-11-22 ENCOUNTER — APPOINTMENT (OUTPATIENT)
Dept: CT IMAGING | Age: 25
End: 2020-11-22
Payer: COMMERCIAL

## 2020-11-22 VITALS
OXYGEN SATURATION: 97 % | SYSTOLIC BLOOD PRESSURE: 106 MMHG | RESPIRATION RATE: 17 BRPM | HEART RATE: 99 BPM | DIASTOLIC BLOOD PRESSURE: 66 MMHG | HEIGHT: 69 IN | WEIGHT: 180 LBS | TEMPERATURE: 98.7 F | BODY MASS INDEX: 26.66 KG/M2

## 2020-11-22 LAB
HCG QUALITATIVE: NEGATIVE
SARS-COV-2, NAAT: DETECTED
SOURCE: ABNORMAL

## 2020-11-22 PROCEDURE — 2580000003 HC RX 258: Performed by: PHYSICIAN ASSISTANT

## 2020-11-22 PROCEDURE — 6370000000 HC RX 637 (ALT 250 FOR IP): Performed by: PHYSICIAN ASSISTANT

## 2020-11-22 PROCEDURE — 71275 CT ANGIOGRAPHY CHEST: CPT

## 2020-11-22 PROCEDURE — U0002 COVID-19 LAB TEST NON-CDC: HCPCS

## 2020-11-22 PROCEDURE — 93010 ELECTROCARDIOGRAM REPORT: CPT | Performed by: INTERNAL MEDICINE

## 2020-11-22 PROCEDURE — 6360000004 HC RX CONTRAST MEDICATION: Performed by: PHYSICIAN ASSISTANT

## 2020-11-22 PROCEDURE — 6360000002 HC RX W HCPCS: Performed by: PHYSICIAN ASSISTANT

## 2020-11-22 PROCEDURE — 84703 CHORIONIC GONADOTROPIN ASSAY: CPT

## 2020-11-22 RX ORDER — DEXAMETHASONE 6 MG/1
6 TABLET ORAL 2 TIMES DAILY WITH MEALS
Qty: 6 TABLET | Refills: 0 | Status: SHIPPED | OUTPATIENT
Start: 2020-11-22 | End: 2020-11-25

## 2020-11-22 RX ORDER — 0.9 % SODIUM CHLORIDE 0.9 %
1000 INTRAVENOUS SOLUTION INTRAVENOUS ONCE
Status: COMPLETED | OUTPATIENT
Start: 2020-11-22 | End: 2020-11-22

## 2020-11-22 RX ORDER — DEXAMETHASONE SODIUM PHOSPHATE 10 MG/ML
8 INJECTION, SOLUTION INTRAMUSCULAR; INTRAVENOUS EVERY 6 HOURS
Status: DISCONTINUED | OUTPATIENT
Start: 2020-11-22 | End: 2020-11-22 | Stop reason: HOSPADM

## 2020-11-22 RX ORDER — AZITHROMYCIN 250 MG/1
TABLET, FILM COATED ORAL
Qty: 1 PACKET | Refills: 0 | Status: SHIPPED | OUTPATIENT
Start: 2020-11-22 | End: 2020-11-26

## 2020-11-22 RX ORDER — ACETAMINOPHEN 500 MG
1000 TABLET ORAL ONCE
Status: COMPLETED | OUTPATIENT
Start: 2020-11-22 | End: 2020-11-22

## 2020-11-22 RX ORDER — SODIUM CHLORIDE 0.9 % (FLUSH) 0.9 %
10 SYRINGE (ML) INJECTION 2 TIMES DAILY
Status: DISCONTINUED | OUTPATIENT
Start: 2020-11-22 | End: 2020-11-22 | Stop reason: HOSPADM

## 2020-11-22 RX ADMIN — KETOROLAC TROMETHAMINE 15 MG: 30 INJECTION, SOLUTION INTRAMUSCULAR; INTRAVENOUS at 00:46

## 2020-11-22 RX ADMIN — IOPAMIDOL 75 ML: 755 INJECTION, SOLUTION INTRAVENOUS at 05:10

## 2020-11-22 RX ADMIN — SODIUM CHLORIDE 1000 ML: 9 INJECTION, SOLUTION INTRAVENOUS at 02:04

## 2020-11-22 RX ADMIN — SODIUM CHLORIDE 1000 ML: 9 INJECTION, SOLUTION INTRAVENOUS at 00:46

## 2020-11-22 RX ADMIN — DEXAMETHASONE SODIUM PHOSPHATE 8 MG: 10 INJECTION, SOLUTION INTRAMUSCULAR; INTRAVENOUS at 06:45

## 2020-11-22 RX ADMIN — ACETAMINOPHEN 1000 MG: 500 TABLET ORAL at 02:00

## 2020-11-22 ASSESSMENT — PAIN SCALES - GENERAL
PAINLEVEL_OUTOF10: 1
PAINLEVEL_OUTOF10: 3
PAINLEVEL_OUTOF10: 0

## 2020-11-22 ASSESSMENT — PAIN DESCRIPTION - PAIN TYPE: TYPE: ACUTE PAIN

## 2020-11-22 ASSESSMENT — PAIN DESCRIPTION - LOCATION: LOCATION: CHEST

## 2020-11-22 NOTE — ED PROVIDER NOTES
Triage Chief Complaint:   Shortness of Breath; Fatigue; and Headache    [place]:  Today in the ED I had the pleasure of caring for Anne Lauren who is a 31 y.o. female that presents  Today to the ED complaining of cp sob, chills, nausea, body aches and fatigue. Her fiance is covid +. She also endorses worsening sob. She has no hx of asthma, copd, respiratory illness. No history of blood clots. No hemoptysis.      ROS:  REVIEW OF SYSTEMS    At least 10 systems reviewed      All other review of systems are negative  See HPI and nursing notes for additional information       Past Medical History:   Diagnosis Date    Common bile duct stone     per old chart pt had gallbladder removed 1/2017-  had MRCP at [facility] 6/23/2017- dx with distal CBD stone- for ERCP 6/28/2017    Depression     GERD (gastroesophageal reflux disease)      Past Surgical History:   Procedure Laterality Date    CHOLECYSTECTOMY  01/2017    ERCP  06/28/2017    removal of CBD stone     Family History   Problem Relation Age of Onset    Diabetes Mother     High Blood Pressure Father      Social History     Socioeconomic History    Marital status: Single     Spouse name: Not on file    Number of children: Not on file    Years of education: Not on file    Highest education level: Not on file   Occupational History    Occupation:      Comment: [address] [employer] [facility] [employer]   Social Needs    Financial resource strain: Not on file    Food insecurity     Worry: Not on file     Inability: Not on file   [employer] needs     Medical: Not on file     Non-medical: Not on file   Tobacco Use    Smoking status: Never Smoker    Smokeless tobacco: Never Used   Substance and Sexual Activity    Alcohol use: Yes     Comment: occasionally     Drug use: No    Sexual activity: Not on file   Lifestyle    Physical activity     Days per week: Not on file     Minutes per session: Not on file    Stress: Not on file   Relationships    Social connections     Talks on phone: Not on file     Gets together: Not on file     Attends Protestant service: Not on file     Active member of club or organization: Not on file     Attends meetings of clubs or organizations: Not on file     Relationship status: Not on file    Intimate partner violence     Fear of current or ex partner: Not on file     Emotionally abused: Not on file     Physically abused: Not on file     Forced sexual activity: Not on file   Other Topics Concern    Not on file   Social History Narrative    Not on file     Current Facility-Administered Medications   Medication Dose Route Frequency Provider Last Rate Last Dose    sodium chloride flush 0.9 % injection 10 mL  10 mL Intravenous BID Jenna Ross PA-C        dexamethasone (PF) (DECADRON) injection 8 mg  8 mg Intravenous Q6H Jenna Ross PA-C         Current Outpatient Medications   Medication Sig Dispense Refill    dexamethasone (DECADRON) 6 MG tablet Take 1 tablet by mouth 2 times daily (with meals) for 3 days 6 tablet 0    azithromycin (ZITHROMAX Z-GLENDA) 250 MG tablet Take 2 tablets (500 mg) on Day 1, and then take 1 tablet (250 mg) on days 2 through 5. 1 packet 0    buPROPion (WELLBUTRIN SR) 100 MG extended release tablet Take 1 tablet by mouth daily 30 tablet 0    famotidine (PEPCID) 40 MG tablet Take 1 tablet by mouth every evening 30 tablet 3     No Known Allergies    Nursing Notes Reviewed    Physical Exam:  ED Triage Vitals [11/21/20 5355]   Enc Vitals Group      BP (!) 126/95      Pulse 107      Resp 17      Temp       Temp src       SpO2 100 %      Weight 180 lb (81.6 kg)      Height 5' 9\" (1.753 m)      Head Circumference       Peak Flow       Pain Score       Pain Loc       Pain Edu? Excl. in [address]?       General :Patient is awake alert oriented person place and time no acute distress nontoxic appearing  HEENT: Pupils are equally round and reactive to light extraocular motors are intact conjunctivae clear sclerae white there is no injection no icterus. Nose without any rhinorrhea or epistaxis. Oral mucosa is moist no exudate buccal mucosa shows no ulcerations. Uvula is midline    Neck: Neck is supple full range of motion trachea midline thyroid nonpalpable  Cardiac: Heart regular rate rhythm no murmurs rubs clicks or gallops  Lungs: Lungs are clear to auscultation there is no wheezing rhonchi or rales. There is no use of accessory muscles no nasal flaring identified. Chest wall: There is no tenderness to palpation over the chest wall or over ribs  Abdomen: Abdomen is soft nontender nondistended. There is no firm or pulsatile masses no rebound rigidity or guarding negative Norman's negative McBurney, no peritoneal signs  Suprapubic:  there is no tenderness to palpation over the external bladder   Musculoskeletal: 5 out of 5 strength in all 4 extremities full flexion extension abduction and adduction supination pronation of all extremities and all digits. No obvious muscle atrophy is noted. No focal muscle deficits are appreciated  Dermatology: Skin is warm and dry there is no obvious abscesses lacerations or lesions noted  Psych: Mentation is grossly normal cognition is grossly normal. Affect is appropriate  Neuro: Motor intact sensory intact cranial nerves II through XII are intact level of consciousness is normal cerebellar function is normal reflexes are grossly normal. No evidence of incontinence or loss of bowel or bladder no saddle anesthesia noted Lymphatic: There is no submandibular or cervical adenopathy appreciated.         I have reviewed and interpreted all of the currently available lab results from this visit (if applicable):  Results for orders placed or performed during the hospital encounter of 11/21/20   CBC Auto Differential   Result Value Ref Range    WBC 6.8 4.0 - 10.5 K/CU MM    RBC 4.81 4.2 - 5.4 M/CU MM    Hemoglobin 15.0 12.5 - 16.0 GM/DL    Hematocrit 42.6 37 - 47 %    MCV 88.6 78 - Sinus tachycardia  Cannot rule out Anterior infarct , age undetermined  Abnormal ECG  No previous ECGs available        Radiographs (if obtained):  [] The following radiograph was interpreted by myself in the absence of a radiologist:   [] Radiologist's Report Reviewed:  CTA PULMONARY W CONTRAST   Preliminary Result   1. Motion limited evaluation of the pulmonary artery is of no evidence large   or central pulmonary embolus. 2. Single focal area of rounded ground-glass attenuation in the right lower   lobe. This is consistent with known of COVID-19 pneumonia. There is   otherwise no significant airspace disease. XR CHEST PORTABLE   Final Result   No acute cardiopulmonary abnormality. EKG (if obtained):   Please See Note of attending physician for EKG interpretation. Chart review shows recent radiograph(s):  No results found. MDM:   A well-appearing nontoxic-appearing female presents today to the ED. Complaining of shortness of breath. Is she does have Covid exposure to surgery also has Covid. Her Covid test here in the ED is positive. She does have fever. As well as tachycardia. Provided with 2 L of fluid is still does have a bit of persistent tachycardia. Her heart rate was in the 105 range when I was in the room talking to her. I did advise admission the patient. As her CT scan does reveal a single focus of pneumonia. As well as patient is having tachycardia. However patient refuses at this time. Citing cost.  Patient is provided with a dose of Decadron here in the ED. I did discuss the fact that Covid pneumonia even though patient is young. This disease can be extremely aggressive. And is not very forgiving. Could cause her to have significant morbidity or even mortality. In which is why I do advise admission to the hospital.  However patient states that she wants to go home. She will come back should her symptoms worsen. In deference the patient.   Patient will be discharged as she is competent enough to make her own medical decisions. Will provide prescription for Decadron azithromycin. With instructions on quarantine and prompt return should her symptoms worsen whatsoever. I have low suspicion of acute coronary syndrome, pulmonary embolism, ARDS at this time. I independently managed patient today in the ED        /69   Pulse 107   Temp 98.7 °F (37.1 °C) (Oral)   Resp 23   Ht 5' 9\" (1.753 m)   Wt 180 lb (81.6 kg)   SpO2 97%   BMI 26.58 kg/m²       Clinical Impression:  1. Pneumonia due to COVID-19 virus    2. Tachycardia        Disposition referral (if applicable):  [facility] Emergency Department  [address] [facility]  [phone]    If symptoms worsen or persist    ELIZABETH Lee - BLAINE Hebert [address] [place] [id number]  [phone]    In 2 days      Disposition medications (if applicable):  New Prescriptions    AZITHROMYCIN (ZITHROMAX Z-GLENDA) 250 MG TABLET    Take 2 tablets (500 mg) on Day 1, and then take 1 tablet (250 mg) on days 2 through 5. DEXAMETHASONE (DECADRON) 6 MG TABLET    Take 1 tablet by mouth 2 times daily (with meals) for 3 days         Comment: Please note this report has been produced using speech recognition software and may contain errors related to that system including errors in grammar, punctuation, and spelling, as well as words and phrases that may be inappropriate. If there are any questions or concerns please feel free to contact the dictating provider for clarification.       Elsie Lewis, [address] [place] [address]  11/22/20 [id number]

## 2020-11-23 ENCOUNTER — CARE COORDINATION (OUTPATIENT)
Dept: CARE COORDINATION | Age: 25
End: 2020-11-23

## 2020-11-24 ENCOUNTER — CARE COORDINATION (OUTPATIENT)
Dept: CARE COORDINATION | Age: 25
End: 2020-11-24

## 2020-11-24 NOTE — CARE COORDINATION
This is AC's final attemp to call the pt in hopes to f/u with ER visit and COVID 19 monitoring. AC did leave the pt a VM. No further attempts will be made. Mann Jean-Baptiste RN  Ambulatory Care Manager  [phone]  [email or web address]

## 2020-11-25 LAB
EKG ATRIAL RATE: 108 BPM
EKG DIAGNOSIS: NORMAL
EKG P AXIS: 55 DEGREES
EKG P-R INTERVAL: 152 MS
EKG Q-T INTERVAL: 332 MS
EKG QRS DURATION: 74 MS
EKG QTC CALCULATION (BAZETT): 444 MS
EKG R AXIS: -1 DEGREES
EKG T AXIS: 27 DEGREES
EKG VENTRICULAR RATE: 108 BPM

## 2020-11-29 ENCOUNTER — APPOINTMENT (OUTPATIENT)
Dept: GENERAL RADIOLOGY | Age: 25
End: 2020-11-29
Payer: COMMERCIAL

## 2020-11-29 ENCOUNTER — APPOINTMENT (OUTPATIENT)
Dept: CT IMAGING | Age: 25
End: 2020-11-29
Payer: COMMERCIAL

## 2020-11-29 ENCOUNTER — HOSPITAL ENCOUNTER (EMERGENCY)
Age: 25
Discharge: HOME | End: 2020-11-29
Attending: EMERGENCY MEDICINE
Payer: COMMERCIAL

## 2020-11-29 VITALS
TEMPERATURE: 101.3 F | OXYGEN SATURATION: 96 % | RESPIRATION RATE: 14 BRPM | BODY MASS INDEX: 28.14 KG/M2 | HEART RATE: 108 BPM | DIASTOLIC BLOOD PRESSURE: 83 MMHG | SYSTOLIC BLOOD PRESSURE: 114 MMHG | HEIGHT: 69 IN | WEIGHT: 190 LBS

## 2020-11-29 LAB
ALBUMIN SERPL-MCNC: 4.1 GM/DL (ref 3.4–5)
ALP BLD-CCNC: 99 IU/L (ref 40–128)
ALT SERPL-CCNC: 50 U/L (ref 10–40)
ANION GAP SERPL CALCULATED.3IONS-SCNC: 13 MMOL/L (ref 4–16)
APTT: 25.4 SECONDS (ref 25.1–37.1)
AST SERPL-CCNC: 26 IU/L (ref 15–37)
BACTERIA: NEGATIVE /HPF
BASOPHILS ABSOLUTE: 0 K/CU MM
BASOPHILS RELATIVE PERCENT: 0.3 % (ref 0–1)
BILIRUB SERPL-MCNC: 0.5 MG/DL (ref 0–1)
BILIRUBIN URINE: NEGATIVE MG/DL
BLOOD, URINE: ABNORMAL
BUN BLDV-MCNC: 10 MG/DL (ref 6–23)
CALCIUM SERPL-MCNC: 9.9 MG/DL (ref 8.3–10.6)
CHLORIDE BLD-SCNC: 98 MMOL/L (ref 99–110)
CLARITY: CLEAR
CO2: 22 MMOL/L (ref 21–32)
COLOR: ABNORMAL
CREAT SERPL-MCNC: 0.8 MG/DL (ref 0.6–1.1)
D DIMER: 392 NG/ML(DDU)
DIFFERENTIAL TYPE: ABNORMAL
EOSINOPHILS ABSOLUTE: 0.1 K/CU MM
EOSINOPHILS RELATIVE PERCENT: 0.6 % (ref 0–3)
GFR AFRICAN AMERICAN: >60 ML/MIN/1.73M2
GFR NON-AFRICAN AMERICAN: >60 ML/MIN/1.73M2
GLUCOSE BLD-MCNC: 131 MG/DL (ref 70–99)
GLUCOSE, URINE: NEGATIVE MG/DL
GONADOTROPIN, CHORIONIC (HCG) QUANT: <0.5 UIU/ML
HCT VFR BLD CALC: 46.4 % (ref 37–47)
HEMOGLOBIN: 16.7 GM/DL (ref 12.5–16)
IMMATURE NEUTROPHIL %: 1 % (ref 0–0.43)
INR BLD: 0.92 INDEX
KETONES, URINE: NEGATIVE MG/DL
LACTATE: 0.8 MMOL/L (ref 0.4–2)
LACTATE: 2.8 MMOL/L (ref 0.4–2)
LEUKOCYTE ESTERASE, URINE: NEGATIVE
LIPASE: 51 IU/L (ref 13–60)
LYMPHOCYTES ABSOLUTE: 1.4 K/CU MM
LYMPHOCYTES RELATIVE PERCENT: 14.5 % (ref 24–44)
MAGNESIUM: 2.1 MG/DL (ref 1.8–2.4)
MCH RBC QN AUTO: 31.5 PG (ref 27–31)
MCHC RBC AUTO-ENTMCNC: 36 % (ref 32–36)
MCV RBC AUTO: 87.4 FL (ref 78–100)
MONOCYTES ABSOLUTE: 0.7 K/CU MM
MONOCYTES RELATIVE PERCENT: 7.6 % (ref 0–4)
MUCUS: ABNORMAL HPF
NITRITE URINE, QUANTITATIVE: NEGATIVE
NUCLEATED RBC %: 0 %
PDW BLD-RTO: 12.3 % (ref 11.7–14.9)
PH, URINE: 6 (ref 5–8)
PLATELET # BLD: 286 K/CU MM (ref 140–440)
PMV BLD AUTO: 10 FL (ref 7.5–11.1)
POTASSIUM SERPL-SCNC: 4.3 MMOL/L (ref 3.5–5.1)
PRO-BNP: 7.44 PG/ML
PROTEIN UA: NEGATIVE MG/DL
PROTHROMBIN TIME: 11.1 SECONDS (ref 11.7–14.5)
RBC # BLD: 5.31 M/CU MM (ref 4.2–5.4)
RBC URINE: 9 /HPF (ref 0–6)
SEGMENTED NEUTROPHILS ABSOLUTE COUNT: 7.3 K/CU MM
SEGMENTED NEUTROPHILS RELATIVE PERCENT: 76 % (ref 36–66)
SODIUM BLD-SCNC: 133 MMOL/L (ref 135–145)
SPECIFIC GRAVITY UA: 1.01 (ref 1–1.03)
SQUAMOUS EPITHELIAL: 1 /HPF
TOTAL CK: 15 IU/L (ref 26–140)
TOTAL IMMATURE NEUTOROPHIL: 0.1 K/CU MM
TOTAL NUCLEATED RBC: 0 K/CU MM
TOTAL PROTEIN: 7.4 GM/DL (ref 6.4–8.2)
TRICHOMONAS: ABNORMAL /HPF
TROPONIN T: <0.01 NG/ML
TSH HIGH SENSITIVITY: 1.09 UIU/ML (ref 0.27–4.2)
UROBILINOGEN, URINE: NORMAL MG/DL (ref 0.2–1)
WBC # BLD: 9.6 K/CU MM (ref 4–10.5)
WBC UA: 1 /HPF (ref 0–5)

## 2020-11-29 PROCEDURE — 80053 COMPREHEN METABOLIC PANEL: CPT

## 2020-11-29 PROCEDURE — 6360000002 HC RX W HCPCS: Performed by: EMERGENCY MEDICINE

## 2020-11-29 PROCEDURE — 96375 TX/PRO/DX INJ NEW DRUG ADDON: CPT

## 2020-11-29 PROCEDURE — 83735 ASSAY OF MAGNESIUM: CPT

## 2020-11-29 PROCEDURE — 84484 ASSAY OF TROPONIN QUANT: CPT

## 2020-11-29 PROCEDURE — 83880 ASSAY OF NATRIURETIC PEPTIDE: CPT

## 2020-11-29 PROCEDURE — 84702 CHORIONIC GONADOTROPIN TEST: CPT

## 2020-11-29 PROCEDURE — 36415 COLL VENOUS BLD VENIPUNCTURE: CPT

## 2020-11-29 PROCEDURE — 85610 PROTHROMBIN TIME: CPT

## 2020-11-29 PROCEDURE — 71275 CT ANGIOGRAPHY CHEST: CPT

## 2020-11-29 PROCEDURE — 96374 THER/PROPH/DIAG INJ IV PUSH: CPT

## 2020-11-29 PROCEDURE — 70450 CT HEAD/BRAIN W/O DYE: CPT

## 2020-11-29 PROCEDURE — 2580000003 HC RX 258: Performed by: EMERGENCY MEDICINE

## 2020-11-29 PROCEDURE — 96365 THER/PROPH/DIAG IV INF INIT: CPT

## 2020-11-29 PROCEDURE — 93005 ELECTROCARDIOGRAM TRACING: CPT | Performed by: EMERGENCY MEDICINE

## 2020-11-29 PROCEDURE — 6370000000 HC RX 637 (ALT 250 FOR IP): Performed by: EMERGENCY MEDICINE

## 2020-11-29 PROCEDURE — 84443 ASSAY THYROID STIM HORMONE: CPT

## 2020-11-29 PROCEDURE — 87040 BLOOD CULTURE FOR BACTERIA: CPT

## 2020-11-29 PROCEDURE — 85025 COMPLETE CBC W/AUTO DIFF WBC: CPT

## 2020-11-29 PROCEDURE — 83690 ASSAY OF LIPASE: CPT

## 2020-11-29 PROCEDURE — 87086 URINE CULTURE/COLONY COUNT: CPT

## 2020-11-29 PROCEDURE — 81001 URINALYSIS AUTO W/SCOPE: CPT

## 2020-11-29 PROCEDURE — 94761 N-INVAS EAR/PLS OXIMETRY MLT: CPT

## 2020-11-29 PROCEDURE — 99284 EMERGENCY DEPT VISIT MOD MDM: CPT

## 2020-11-29 PROCEDURE — 6360000004 HC RX CONTRAST MEDICATION: Performed by: EMERGENCY MEDICINE

## 2020-11-29 PROCEDURE — 83605 ASSAY OF LACTIC ACID: CPT

## 2020-11-29 PROCEDURE — 82550 ASSAY OF CK (CPK): CPT

## 2020-11-29 PROCEDURE — 71045 X-RAY EXAM CHEST 1 VIEW: CPT

## 2020-11-29 PROCEDURE — 85730 THROMBOPLASTIN TIME PARTIAL: CPT

## 2020-11-29 PROCEDURE — 96360 HYDRATION IV INFUSION INIT: CPT

## 2020-11-29 PROCEDURE — 85379 FIBRIN DEGRADATION QUANT: CPT

## 2020-11-29 RX ORDER — GUAIFENESIN/DEXTROMETHORPHAN 100-10MG/5
5 SYRUP ORAL 4 TIMES DAILY PRN
Qty: 120 ML | Refills: 0 | Status: SHIPPED | OUTPATIENT
Start: 2020-11-29 | End: 2020-12-09

## 2020-11-29 RX ORDER — ALBUTEROL SULFATE 90 UG/1
2 AEROSOL, METERED RESPIRATORY (INHALATION) EVERY 4 HOURS PRN
Qty: 1 INHALER | Refills: 1 | Status: SHIPPED | OUTPATIENT
Start: 2020-11-29 | End: 2021-04-26 | Stop reason: CLARIF

## 2020-11-29 RX ORDER — 0.9 % SODIUM CHLORIDE 0.9 %
1000 INTRAVENOUS SOLUTION INTRAVENOUS ONCE
Status: COMPLETED | OUTPATIENT
Start: 2020-11-29 | End: 2020-11-29

## 2020-11-29 RX ORDER — KETOROLAC TROMETHAMINE 30 MG/ML
30 INJECTION, SOLUTION INTRAMUSCULAR; INTRAVENOUS ONCE
Status: COMPLETED | OUTPATIENT
Start: 2020-11-29 | End: 2020-11-29

## 2020-11-29 RX ORDER — 0.9 % SODIUM CHLORIDE 0.9 %
1000 INTRAVENOUS SOLUTION INTRAVENOUS ONCE
Status: DISCONTINUED | OUTPATIENT
Start: 2020-11-29 | End: 2020-11-29 | Stop reason: HOSPADM

## 2020-11-29 RX ORDER — ACETAMINOPHEN 500 MG
1000 TABLET ORAL ONCE
Status: COMPLETED | OUTPATIENT
Start: 2020-11-29 | End: 2020-11-29

## 2020-11-29 RX ORDER — BENZONATATE 100 MG/1
100 CAPSULE ORAL 3 TIMES DAILY PRN
Qty: 10 CAPSULE | Refills: 0 | Status: SHIPPED | OUTPATIENT
Start: 2020-11-29 | End: 2020-12-06

## 2020-11-29 RX ORDER — ONDANSETRON 2 MG/ML
4 INJECTION INTRAMUSCULAR; INTRAVENOUS EVERY 30 MIN PRN
Status: DISCONTINUED | OUTPATIENT
Start: 2020-11-29 | End: 2020-11-29 | Stop reason: HOSPADM

## 2020-11-29 RX ORDER — LEVOFLOXACIN 5 MG/ML
500 INJECTION, SOLUTION INTRAVENOUS ONCE
Status: COMPLETED | OUTPATIENT
Start: 2020-11-29 | End: 2020-11-29

## 2020-11-29 RX ORDER — ACETAMINOPHEN 325 MG/1
650 TABLET ORAL EVERY 6 HOURS PRN
Qty: 120 TABLET | Refills: 3 | Status: SHIPPED | OUTPATIENT
Start: 2020-11-29 | End: 2021-04-26 | Stop reason: CLARIF

## 2020-11-29 RX ORDER — LEVOFLOXACIN 500 MG/1
500 TABLET, FILM COATED ORAL DAILY
Qty: 7 TABLET | Refills: 0 | Status: SHIPPED | OUTPATIENT
Start: 2020-11-29 | End: 2020-12-06

## 2020-11-29 RX ADMIN — IOPAMIDOL 70 ML: 755 INJECTION, SOLUTION INTRAVENOUS at 11:47

## 2020-11-29 RX ADMIN — LEVOFLOXACIN 500 MG: 5 INJECTION, SOLUTION INTRAVENOUS at 10:17

## 2020-11-29 RX ADMIN — KETOROLAC TROMETHAMINE 30 MG: 30 INJECTION, SOLUTION INTRAMUSCULAR; INTRAVENOUS at 13:44

## 2020-11-29 RX ADMIN — SODIUM CHLORIDE 1000 ML: 9 INJECTION, SOLUTION INTRAVENOUS at 10:09

## 2020-11-29 RX ADMIN — ACETAMINOPHEN 1000 MG: 500 TABLET ORAL at 10:20

## 2020-11-29 RX ADMIN — ONDANSETRON 4 MG: 2 INJECTION INTRAMUSCULAR; INTRAVENOUS at 10:20

## 2020-11-29 ASSESSMENT — PAIN SCALES - GENERAL
PAINLEVEL_OUTOF10: 6
PAINLEVEL_OUTOF10: 6
PAINLEVEL_OUTOF10: 7
PAINLEVEL_OUTOF10: 5

## 2020-11-29 ASSESSMENT — ENCOUNTER SYMPTOMS
NAUSEA: 1
COUGH: 1
EYES NEGATIVE: 1
ALLERGIC/IMMUNOLOGIC NEGATIVE: 1
SHORTNESS OF BREATH: 1

## 2020-11-29 ASSESSMENT — PAIN DESCRIPTION - LOCATION: LOCATION: HEAD;CHEST

## 2020-11-29 NOTE — ED PROVIDER NOTES
[facility]      TRIAGE CHIEF COMPLAINT:   Dizziness (lightheaded, feels dehydrated, covid + on 11-21)      [place]:  Roland Elias is a 31 y.o. female that presents with a complaint of worsening Covid symptoms. Patient was diagnosed on 1121 she finished a course of steroids and azithromycin she states but now having worsening palpitations chest pain shortness of breath cough fever malaise fatigue lightheadedness dizziness. Also has a headache. She did not take any Tylenol today on arrival she is febrile tachycardic denies any heart or lung problems no history of PE denies being pregnant no other questions or concerns. REVIEW OF SYSTEMS:  At least 10 systems reviewed and otherwise acutely negative except as in the [address] Review of Systems   Constitutional: Positive for activity change, chills, fatigue and fever. HENT: Negative. Eyes: Negative. Respiratory: Positive for cough and shortness of breath. Cardiovascular: Positive for chest pain and palpitations. Gastrointestinal: Positive for nausea. Endocrine: Negative. Genitourinary: Negative. Musculoskeletal: Positive for arthralgias and myalgias. Skin: Negative. Allergic/Immunologic: Negative. Neurological: Positive for dizziness, light-headedness and headaches. Hematological: Negative. Psychiatric/Behavioral: Negative. All other systems reviewed and are negative.       Past Medical History:   Diagnosis Date    Common bile duct stone     per old chart pt had gallbladder removed 1/2017-  had MRCP at [facility] 6/23/2017- dx with distal CBD stone- for ERCP 6/28/2017    Depression     GERD (gastroesophageal reflux disease)      Past Surgical History:   Procedure Laterality Date    CHOLECYSTECTOMY  01/2017    ERCP  06/28/2017    removal of CBD stone     Family History   Problem Relation Age of Onset    Diabetes Mother     High Blood Pressure Father      Social History     Socioeconomic History    Marital status: Single     Spouse name: Not on file    Number of children: Not on file    Years of education: Not on file    Highest education level: Not on file   Occupational History    Occupation:      Comment: [address] [employer] services   Social Needs    Financial resource strain: Not on file    Food insecurity     Worry: Not on file     Inability: Not on file   [employer] needs     Medical: Not on file     Non-medical: Not on file   Tobacco Use    Smoking status: Never Smoker    Smokeless tobacco: Never Used   Substance and Sexual Activity    Alcohol use: Yes     Comment: occasionally     Drug use: No    Sexual activity: Not on file   Lifestyle    Physical activity     Days per week: Not on file     Minutes per session: Not on file    Stress: Not on file   Relationships    Social connections     Talks on phone: Not on file     Gets together: Not on file     Attends [employer] service: Not on file     Active member of club or organization: Not on file     Attends meetings of clubs or organizations: Not on file     Relationship status: Not on file    Intimate partner violence     Fear of current or ex partner: Not on file     Emotionally abused: Not on file     Physically abused: Not on file     Forced sexual activity: Not on file   Other Topics Concern    Not on file   Social History Narrative    Not on file     Current Facility-Administered Medications   Medication Dose Route Frequency Provider Last Rate Last Dose    ondansetron (ZOFRAN) injection 4 mg  4 mg Intravenous Q30 Min PRN Celina Warn[place], DO   4 mg at 11/29/20 1020    0.9 % sodium chloride bolus  1,000 mL Intravenous Once Celina Warn[place], DO        0.9 % sodium chloride bolus  1,000 mL Intravenous Once Celina [place], DO         Current Outpatient Medications   Medication Sig Dispense Refill    benzonatate (TESSALON PERLES) 100 MG capsule Take 1 capsule by mouth 3 times daily as needed for Cough 10 capsule 0    guaiFENesin-dextromethorphan (ROBITUSSIN DM) 100-10 MG/5ML syrup Take 5 mLs by mouth 4 times daily as needed for Cough 120 mL 0    albuterol sulfate HFA (PROVENTIL HFA) 108 (90 Base) MCG/ACT inhaler Inhale 2 puffs into the lungs every 4 hours as needed for Wheezing or Shortness of Breath With spacer (and mask if indicated). Thanks. 1 Inhaler 1    levoFLOXacin (LEVAQUIN) 500 MG tablet Take 1 tablet by mouth daily for 7 days 7 tablet 0    acetaminophen (TYLENOL) 325 MG tablet Take 2 tablets by mouth every 6 hours as needed for Pain 120 tablet 3    buPROPion (WELLBUTRIN SR) 100 MG extended release tablet Take 1 tablet by mouth daily 30 tablet 0    famotidine (PEPCID) 40 MG tablet Take 1 tablet by mouth every evening 30 tablet 3      No Known Allergies  Current Facility-Administered Medications   Medication Dose Route Frequency Provider Last Rate Last Dose    ondansetron (ZOFRAN) injection 4 mg  4 mg Intravenous Q30 Min PRN [facility], DO   4 mg at 11/29/20 1020    0.9 % sodium chloride bolus  1,000 mL Intravenous Once Mini[employer]on SigFig, DO        0.9 % sodium chloride bolus  1,000 mL Intravenous Once [facility], DO         Current Outpatient Medications   Medication Sig Dispense Refill    benzonatate (TESSALON PERLES) 100 MG capsule Take 1 capsule by mouth 3 times daily as needed for Cough 10 capsule 0    guaiFENesin-dextromethorphan (ROBITUSSIN DM) 100-10 MG/5ML syrup Take 5 mLs by mouth 4 times daily as needed for Cough 120 mL 0    albuterol sulfate HFA (PROVENTIL HFA) 108 (90 Base) MCG/ACT inhaler Inhale 2 puffs into the lungs every 4 hours as needed for Wheezing or Shortness of Breath With spacer (and mask if indicated). Thanks.  1 Inhaler 1    levoFLOXacin (LEVAQUIN) 500 MG tablet Take 1 tablet by mouth daily for 7 days 7 tablet 0    acetaminophen (TYLENOL) 325 MG tablet Take 2 tablets by mouth every 6 hours as needed for Pain 120 tablet 3    buPROPion (WELLBUTRIN SR) 100 MG extended release tablet Take 1 tablet by mouth daily 30 tablet 0    famotidine (PEPCID) 40 MG tablet Take 1 tablet by mouth every evening 30 tablet 3       Nursing Notes Reviewed    VITAL SIGNS:  ED Triage Vitals   Enc Vitals Group      BP       Pulse       Resp       Temp       Temp src       SpO2       Weight       Height       Head Circumference       Peak Flow       Pain Score       Pain Loc       Pain Edu? Excl. in [address]? PHYSICAL EXAM:  Physical Exam  Vitals signs and nursing note reviewed. Constitutional:       General: She is not in acute distress. Appearance: Normal appearance. She is well-developed and well-groomed. She is ill-appearing. She is not toxic-appearing or diaphoretic. HENT:      Head: Normocephalic and atraumatic. Right Ear: External ear normal.      Left Ear: External ear normal.      Nose: No congestion or rhinorrhea. Mouth/Throat:      Pharynx: No oropharyngeal exudate or posterior oropharyngeal erythema. Eyes:      General: No scleral icterus. Right eye: No discharge. Left eye: No discharge. Extraocular Movements: Extraocular movements intact. Conjunctiva/sclera: Conjunctivae normal.   Neck:      Musculoskeletal: Full passive range of motion without pain and normal range of motion. Normal range of motion. No edema, erythema, neck rigidity, crepitus, injury, pain with movement or torticollis. Vascular: No JVD. Trachea: Phonation normal.   Cardiovascular:      Rate and Rhythm: Regular rhythm. Tachycardia present. Pulses: Normal pulses. Heart sounds: Normal heart sounds. No murmur. No friction rub. No gallop. Pulmonary:      Effort: Pulmonary effort is normal. No respiratory distress. Breath sounds: Normal breath sounds. No stridor. No wheezing, rhonchi or rales. Abdominal:      General: Bowel sounds are normal. There is no distension. Palpations: Abdomen is soft. There is no mass. Tenderness:  There is no abdominal tenderness. There is no guarding or rebound. Negative signs include Norman's sign, Rovsing's sign and McBurney's sign. Hernia: No hernia is present. Musculoskeletal: Normal range of motion. General: No swelling, tenderness, deformity or signs of injury. Right lower leg: No edema. Skin:     General: Skin is warm. Coloration: Skin is not jaundiced or pale. Findings: No bruising, erythema, lesion or rash. Neurological:      General: No focal deficit present. Mental Status: She is alert and oriented to person, place, and time. GCS: GCS eye subscore is 4. GCS verbal subscore is 5. GCS motor subscore is 6. Cranial Nerves: Cranial nerves are intact. No cranial nerve deficit, dysarthria or facial asymmetry. Sensory: Sensation is intact. No sensory deficit. Motor: Motor function is intact. No weakness, tremor, atrophy, abnormal muscle tone or seizure activity. Coordination: Coordination is intact. Coordination normal.   Psychiatric:         Mood and Affect: Mood normal.         Behavior: Behavior normal. Behavior is cooperative. Thought Content:  Thought content normal.         Judgment: Judgment normal.           I have reviewed andinterpreted all of the currently available lab results from this visit (if applicable):    Results for orders placed or performed during the hospital encounter of 11/29/20   CBC Auto Differential   Result Value Ref Range    WBC 9.6 4.0 - 10.5 K/CU MM    RBC 5.31 4.2 - 5.4 M/CU MM    Hemoglobin 16.7 (H) 12.5 - 16.0 GM/DL    Hematocrit 46.4 37 - 47 %    MCV 87.4 78 - 100 FL    MCH 31.5 (H) 27 - 31 PG    MCHC 36.0 32.0 - 36.0 %    RDW 12.3 11.7 - 14.9 %    Platelets 368 707 - 325 K/CU MM    MPV 10.0 7.5 - 11.1 FL    Differential Type AUTOMATED DIFFERENTIAL     Segs Relative 76.0 (H) 36 - 66 %    Lymphocytes % 14.5 (L) 24 - 44 %    Monocytes % 7.6 (H) 0 - 4 %    Eosinophils % 0.6 0 - 3 %    Basophils % 0.3 0 - 1 % Segs Absolute 7.3 K/CU MM    Lymphocytes Absolute 1.4 K/CU MM    Monocytes Absolute 0.7 K/CU MM    Eosinophils Absolute 0.1 K/CU MM    Basophils Absolute 0.0 K/CU MM    Nucleated RBC % 0.0 %    Total Nucleated RBC 0.0 K/CU MM    Total Immature Neutrophil 0.10 K/CU MM    Immature Neutrophil % 1.0 (H) 0 - 0.43 %   CMP   Result Value Ref Range    Sodium 133 (L) 135 - 145 MMOL/L    Potassium 4.3 3.5 - 5.1 MMOL/L    Chloride 98 (L) 99 - 110 mMol/L    CO2 22 21 - 32 MMOL/L    BUN 10 6 - 23 MG/DL    CREATININE 0.8 0.6 - 1.1 MG/DL    Glucose 131 (H) 70 - 99 MG/DL    Calcium 9.9 8.3 - 10.6 MG/DL    Alb 4.1 3.4 - 5.0 GM/DL    Total Protein 7.4 6.4 - 8.2 GM/DL    Total Bilirubin 0.5 0.0 - 1.0 MG/DL    ALT 50 (H) 10 - 40 U/L    AST 26 15 - 37 IU/L    Alkaline Phosphatase 99 40 - 128 IU/L    GFR Non-African American >60 >60 mL/min/1.73m2    GFR African American >60 >60 mL/min/1.73m2    Anion Gap 13 4 - 16   Lipase   Result Value Ref Range    Lipase 51 13 - 60 IU/L   Lactic Acid, Plasma   Result Value Ref Range    Lactate 2.8 (HH) 0.4 - 2.0 mMOL/L   Urinalysis   Result Value Ref Range    Color, UA STRAW (A) YELLOW    Clarity, UA CLEAR CLEAR    Glucose, Urine NEGATIVE NEGATIVE MG/DL    Bilirubin Urine NEGATIVE NEGATIVE MG/DL    Ketones, Urine NEGATIVE NEGATIVE MG/DL    Specific Gravity, UA 1.012 1.001 - 1.035    Blood, Urine LARGE (A) NEGATIVE    pH, Urine 6.0 5.0 - 8.0    Protein, UA NEGATIVE NEGATIVE MG/DL    Urobilinogen, Urine NORMAL 0.2 - 1.0 MG/DL    Nitrite Urine, Quantitative NEGATIVE NEGATIVE    Leukocyte Esterase, Urine NEGATIVE NEGATIVE    RBC, UA 9 (H) 0 - 6 /HPF    WBC, UA 1 0 - 5 /HPF    Bacteria, UA NEGATIVE NEGATIVE /HPF    Squam Epithel, UA 1 /HPF    Mucus, UA RARE (A) NEGATIVE HPF    Trichomonas, UA NONE SEEN NONE SEEN /HPF   D-dimer, Quantitative   Result Value Ref Range    D-Dimer, Quant 392 (H) <230 NG/mL(DDU)   Protime/INR & PTT   Result Value Ref Range    Protime 11.1 (L) 11.7 - 14.5 SECONDS    INR 0.92 infection Lung    ANTIBIOTIC SELECTION RATIONAL  Levaquin    ANTIBIOTIC SELECTION LIMITATIONS  Documented failure    LACTIC ACID    Initial     See documentation  Follow - up if initial abnormal  See documentation    SEPTIC SHOCK CRITERIA:  SBP <90 or 40 reduction from baseline refractory to fluid resuscitation:  No  Serum Lactic Acid >4:   See documentation    FLUIDS  Saline 30 ml/kg given (over 30-60 min) No  (Septic SHOCK or lactic > 4)    FLUID ADMINISTRATION BARRIERS  Covid patient, trying to avoid fluid overolad     OTHER TREATMENT BARRIERS  None    CENTRAL LINE INSERTED? not applicable    Dheeraj Alexandre      Total critical care time today provided was at least 25 minutes. This excludes seperately billable procedure. Critical care time provided for reviewing labs, images, giving fluids, antibiotics, antipyretics that required close evaluation and/or intervention with concern for patient decompensation. MDM:    Patient here with worsening Covid symptoms. Again she was diagnosed on 1121 she finished a course of steroids and azithromycin states she had pneumonia at that time was discharged home did not stay in the hospital now has worsening symptoms took medicine yesterday nothing today she finished again course of treatment earlier she has a headache chest pain shortness of breath palpitations dizziness lightheadedness nausea aches and chills. Patient has a fever on arrival give her Tylenol she has finished azithromycin I will give her Levaquin here for possible pneumonia. I will get labs imaging including PE study, head CT given fever headache chest pain shortness of breath palpitations otherwise patient stable patient given pain nausea medicine IV fluids oxygen as needed. Patient observed here for over 3 hours she did well tachycardia comes and goes she is fine at rest are IV fluids.   Upon ambulation in the room she did not desaturate her heart rate did go up to 130s 140s while ambulating which is expected with Covid. She does have slight worsening of her pneumonia I gave her Levaquin here and to go home with her lactic acid was slightly elevated but her blood pressure remained stable vital signs otherwise remained stable she is in bed comfortable resting legs crossed sitting up is not needing oxygen feels okay going home I did talk to hospital medicine given that she is not hypoxic and we are short on beds holding off on admission at this time given she does not need oxygen therapy. Patient stable for discharge home given very strict return precautions and follow-up information and supportive care medication for home she understands agrees this plan at this time that she is comfortable going home safe going home work-up otherwise negative discharge. She otherwise appears nontoxic nonseptic. Symptoms likely due to coronavirus. CLINICAL IMPRESSION:  Final diagnoses:   COVID-19   Tachycardia   Dizziness   Positive D dimer   Pneumonia due to organism       (Please note that portions of this note may have been completed with a voice recognition program. Efforts were made to edit the dictations but occasionally words aremis-transcribed.)    DISPOSITION REFERRAL (if applicable):  ELIZABETH Harris - CNP  [place] [address]  [phone]    In 1 day      [facility] Emergency Department  [address]  [phone]    If symptoms worsen      DISPOSITION MEDICATIONS (if applicable):  New Prescriptions    ACETAMINOPHEN (TYLENOL) 325 MG TABLET    Take 2 tablets by mouth every 6 hours as needed for Pain    ALBUTEROL SULFATE HFA (PROVENTIL HFA) 108 (90 BASE) MCG/ACT INHALER    Inhale 2 puffs into the lungs every 4 hours as needed for Wheezing or Shortness of Breath With spacer (and mask if indicated). Thanks.     BENZONATATE (TESSALON PERLES) 100 MG CAPSULE    Take 1 capsule by mouth 3 times daily as needed for Cough GUAIFENESIN-DEXTROMETHORPHAN (ROBITUSSIN DM) 100-10 MG/5ML SYRUP    Take 5 mLs by mouth 4 times daily as needed for Cough    LEVOFLOXACIN (LEVAQUIN) 500 MG TABLET    Take 1 tablet by mouth daily for 7 days          DO Guillermo Rivera DO  11/29/20 5822

## 2020-11-29 NOTE — ED NOTES
Ambulate patient to trauma room 2, vitals completed, patient placed in the gown and on the monitor. EKG was completed. Kylee Key  11/29/20 1008

## 2020-11-29 NOTE — ED NOTES
Ambulated with patient on room air. .. prior to ambulation, patient was at 98% RA and HR 95. During ambulation, oxygen maintained at 97% and HR increased to 150.      Patient resting in room, call light in reach     KRYSTINA Burris  11/29/20 1257

## 2020-11-29 NOTE — ED NOTES
Discharge instructions reviewed with patient. Medications and follow up were discussed.  Patient denies further questions and verbalizes understanding     Antonia Horton RN  11/29/20 4639

## 2020-11-30 ENCOUNTER — CARE COORDINATION (OUTPATIENT)
Dept: CARE COORDINATION | Age: 25
End: 2020-11-30

## 2020-11-30 LAB
CULTURE: NORMAL
Lab: NORMAL
SPECIMEN: NORMAL

## 2020-11-30 PROCEDURE — 93010 ELECTROCARDIOGRAM REPORT: CPT | Performed by: INTERNAL MEDICINE

## 2020-11-30 NOTE — CARE COORDINATION
Call to check on pt status after recent ER visit for worsening palpitations, chest pain, shortness of breath, cough, fever, malaise, fatigue, lightheadedness, dizziness, headache. LM with ACM contact information & requested a call back. SURESH CarusoN RN  Ambulatory Care Manager  [phone] office/cell  [phone] fax  [email or web address]

## 2020-12-01 ENCOUNTER — CARE COORDINATION (OUTPATIENT)
Dept: CARE COORDINATION | Age: 25
End: 2020-12-01

## 2020-12-01 NOTE — CARE COORDINATION
Call to check on pt status after recent ER visit for worsening palpitations, chest pain, shortness of breath, cough, fever, malaise, fatigue, lightheadedness, dizziness, headache. LM with ACM contact information & requested a call back. 2nd attempt, no further outreach planned. SURESH MilliganN RN  Ambulatory Care Manager  [phone] office/cell  [phone] fax  [email or web address]

## 2020-12-04 LAB
CULTURE: NORMAL
CULTURE: NORMAL
EKG ATRIAL RATE: 144 BPM
EKG DIAGNOSIS: NORMAL
EKG P AXIS: 67 DEGREES
EKG P-R INTERVAL: 130 MS
EKG Q-T INTERVAL: 258 MS
EKG QRS DURATION: 70 MS
EKG QTC CALCULATION (BAZETT): 399 MS
EKG R AXIS: 35 DEGREES
EKG T AXIS: 36 DEGREES
EKG VENTRICULAR RATE: 144 BPM
Lab: NORMAL
Lab: NORMAL
SPECIMEN: NORMAL
SPECIMEN: NORMAL

## 2020-12-21 RX ORDER — BUPROPION HYDROCHLORIDE 100 MG/1
TABLET, EXTENDED RELEASE ORAL
Qty: 30 TABLET | Refills: 0 | Status: SHIPPED | OUTPATIENT
Start: 2020-12-21 | End: 2021-03-11 | Stop reason: SDUPTHER

## 2020-12-21 RX ORDER — FAMOTIDINE 40 MG/1
TABLET, FILM COATED ORAL
Qty: 30 TABLET | Refills: 2 | Status: SHIPPED | OUTPATIENT
Start: 2020-12-21 | End: 2022-05-12 | Stop reason: CLARIF

## 2020-12-21 RX ORDER — BUPROPION HYDROCHLORIDE 100 MG/1
100 TABLET, EXTENDED RELEASE ORAL DAILY
Qty: 30 TABLET | Refills: 0 | Status: SHIPPED | OUTPATIENT
Start: 2020-12-21 | End: 2020-12-21

## 2021-03-11 DIAGNOSIS — F32.A ANXIETY AND DEPRESSION: ICD-10-CM

## 2021-03-11 DIAGNOSIS — F41.9 ANXIETY AND DEPRESSION: ICD-10-CM

## 2021-03-12 RX ORDER — BUPROPION HYDROCHLORIDE 100 MG/1
100 TABLET, EXTENDED RELEASE ORAL DAILY
Qty: 30 TABLET | Refills: 0 | Status: SHIPPED
Start: 2021-03-12 | End: 2021-04-26 | Stop reason: ALTCHOICE

## 2021-04-26 ENCOUNTER — OFFICE VISIT (OUTPATIENT)
Dept: INTERNAL MEDICINE CLINIC | Age: 26
End: 2021-04-26
Payer: COMMERCIAL

## 2021-04-26 VITALS
BODY MASS INDEX: 24.5 KG/M2 | SYSTOLIC BLOOD PRESSURE: 119 MMHG | WEIGHT: 165.4 LBS | DIASTOLIC BLOOD PRESSURE: 69 MMHG | HEIGHT: 69 IN | HEART RATE: 91 BPM | RESPIRATION RATE: 20 BRPM | OXYGEN SATURATION: 98 %

## 2021-04-26 DIAGNOSIS — F41.9 ANXIETY AND DEPRESSION: ICD-10-CM

## 2021-04-26 DIAGNOSIS — F32.A ANXIETY AND DEPRESSION: ICD-10-CM

## 2021-04-26 DIAGNOSIS — Z00.00 ENCOUNTER FOR PREVENTIVE HEALTH EXAMINATION: Primary | ICD-10-CM

## 2021-04-26 DIAGNOSIS — Z00.00 ENCOUNTER FOR PREVENTIVE HEALTH EXAMINATION: ICD-10-CM

## 2021-04-26 DIAGNOSIS — K21.9 GASTROESOPHAGEAL REFLUX DISEASE WITHOUT ESOPHAGITIS: ICD-10-CM

## 2021-04-26 LAB
A/G RATIO: 2.2 (ref 1.1–2.2)
ALBUMIN SERPL-MCNC: 4.6 G/DL (ref 3.4–5)
ALP BLD-CCNC: 111 U/L (ref 40–129)
ALT SERPL-CCNC: 12 U/L (ref 10–40)
ANION GAP SERPL CALCULATED.3IONS-SCNC: 12 MMOL/L (ref 3–16)
AST SERPL-CCNC: 14 U/L (ref 15–37)
BASOPHILS ABSOLUTE: 0 K/UL (ref 0–0.2)
BASOPHILS RELATIVE PERCENT: 0.6 %
BILIRUB SERPL-MCNC: 0.4 MG/DL (ref 0–1)
BUN BLDV-MCNC: 9 MG/DL (ref 7–20)
CALCIUM SERPL-MCNC: 9.6 MG/DL (ref 8.3–10.6)
CHLORIDE BLD-SCNC: 105 MMOL/L (ref 99–110)
CHOLESTEROL, FASTING: 172 MG/DL (ref 0–199)
CO2: 25 MMOL/L (ref 21–32)
CREAT SERPL-MCNC: 0.8 MG/DL (ref 0.6–1.1)
EOSINOPHILS ABSOLUTE: 0 K/UL (ref 0–0.6)
EOSINOPHILS RELATIVE PERCENT: 0.7 %
GFR AFRICAN AMERICAN: >60
GFR NON-AFRICAN AMERICAN: >60
GLOBULIN: 2.1 G/DL
GLUCOSE BLD-MCNC: 87 MG/DL (ref 70–99)
HCT VFR BLD CALC: 42.7 % (ref 36–48)
HDLC SERPL-MCNC: 35 MG/DL (ref 40–60)
HEMOGLOBIN: 15.2 G/DL (ref 12–16)
LDL CHOLESTEROL CALCULATED: 104 MG/DL
LYMPHOCYTES ABSOLUTE: 1.7 K/UL (ref 1–5.1)
LYMPHOCYTES RELATIVE PERCENT: 31.1 %
MCH RBC QN AUTO: 31.1 PG (ref 26–34)
MCHC RBC AUTO-ENTMCNC: 35.6 G/DL (ref 31–36)
MCV RBC AUTO: 87.3 FL (ref 80–100)
MONOCYTES ABSOLUTE: 0.3 K/UL (ref 0–1.3)
MONOCYTES RELATIVE PERCENT: 6.1 %
NEUTROPHILS ABSOLUTE: 3.3 K/UL (ref 1.7–7.7)
NEUTROPHILS RELATIVE PERCENT: 61.5 %
PDW BLD-RTO: 12.6 % (ref 12.4–15.4)
PLATELET # BLD: 262 K/UL (ref 135–450)
PMV BLD AUTO: 9.2 FL (ref 5–10.5)
POTASSIUM SERPL-SCNC: 4.5 MMOL/L (ref 3.5–5.1)
RBC # BLD: 4.89 M/UL (ref 4–5.2)
SODIUM BLD-SCNC: 142 MMOL/L (ref 136–145)
TOTAL PROTEIN: 6.7 G/DL (ref 6.4–8.2)
TRIGLYCERIDE, FASTING: 164 MG/DL (ref 0–150)
TSH REFLEX: 1.37 UIU/ML (ref 0.27–4.2)
VLDLC SERPL CALC-MCNC: 33 MG/DL
WBC # BLD: 5.3 K/UL (ref 4–11)

## 2021-04-26 PROCEDURE — 36415 COLL VENOUS BLD VENIPUNCTURE: CPT | Performed by: NURSE PRACTITIONER

## 2021-04-26 PROCEDURE — 99395 PREV VISIT EST AGE 18-39: CPT | Performed by: NURSE PRACTITIONER

## 2021-04-26 RX ORDER — BUPROPION HYDROCHLORIDE 150 MG/1
150 TABLET ORAL EVERY MORNING
Qty: 30 TABLET | Refills: 2 | Status: SHIPPED | OUTPATIENT
Start: 2021-04-26 | End: 2021-07-22

## 2021-04-26 RX ORDER — BUPROPION HYDROCHLORIDE 100 MG/1
100 TABLET, EXTENDED RELEASE ORAL DAILY
Qty: 30 TABLET | Refills: 0 | Status: CANCELLED | OUTPATIENT
Start: 2021-04-26

## 2021-04-26 ASSESSMENT — ENCOUNTER SYMPTOMS
COUGH: 0
COLOR CHANGE: 0
CHEST TIGHTNESS: 0
NAUSEA: 0
SINUS PAIN: 0
DIARRHEA: 0
APNEA: 0
VOMITING: 0
SHORTNESS OF BREATH: 0
ABDOMINAL PAIN: 0
SINUS PRESSURE: 0

## 2021-04-26 NOTE — PROGRESS NOTES
2021    Kelli Farmer (:  1995) is a 31 y.o. female, here for a preventive medicine evaluation. Patient Active Problem List   Diagnosis    GERD (gastroesophageal reflux disease)    Anxiety and depression     Overall, she denies any new c/o for her health since last August. Needs Biometric screening for health insurance. Still working as  with [address] Since being of the Wellbutrin she has noticed a significant improvement in her emotional healtlh. Has felt much less depressed and anxious. Tends to feel much more depressed right before her menstrual cycles. Day to day, she feels wel overall. Denies any SI/HI. Has not had any reported side effects since being on this med. Patient's reflux well controlled on current medications. Patient denies any blood in stool black stool, heartburn, reflux. Denies issues with frequent coughing or reflux while sleeping. Able to eat and drink appropriately without complications. Doing well on Pepcid 40 mg daily. Health Maintenace:  -declines HIV/Hep C Screen:  -Due for PAP: sees Dr Alecia Lee. Review of Systems   Constitutional: Negative for activity change, appetite change, fatigue and fever. HENT: Negative for congestion, nosebleeds, sinus pressure and sinus pain. Respiratory: Negative for apnea, cough, chest tightness and shortness of breath. Cardiovascular: Negative for chest pain and palpitations. Gastrointestinal: Negative for abdominal pain, diarrhea, nausea and vomiting. Genitourinary: Negative for difficulty urinating, flank pain and hematuria. Musculoskeletal: Negative for arthralgias, joint swelling and myalgias. Skin: Negative for color change and rash. Neurological: Negative for dizziness, light-headedness and headaches. Psychiatric/Behavioral: Positive for dysphoric mood. Negative for behavioral problems, self-injury, sleep disturbance and suicidal ideas. The patient is not nervous/anxious. Prior to Visit Medications    Medication Sig Taking?  Authorizing Provider   buPROPion (WELLBUTRIN XL) 150 MG extended release tablet Take 1 tablet by mouth every morning Yes ELIZABETH Olguin CNP   famotidine (PEPCID) 40 MG tablet TAKE ONE TABLET BY MOUTH EVERY EVENING Yes ELIZABETH Olguin CNP        No Known Allergies    Past Medical History:   Diagnosis Date    Common bile duct stone     per old chart pt had gallbladder removed 1/2017-  had MRCP at [facility] 6/23/2017- dx with distal CBD stone- for ERCP 6/28/2017    Depression     GERD (gastroesophageal reflux disease)        Past Surgical History:   Procedure Laterality Date    CHOLECYSTECTOMY  01/2017    ERCP  06/28/2017    removal of CBD stone       Social History     Socioeconomic History    Marital status: Single     Spouse name: Not on file    Number of children: Not on file    Years of education: Not on file    Highest education level: Not on file   Occupational History    Occupation:      Comment: [address] [employer]   Social Needs    Financial resource strain: Not on file    Food insecurity     Worry: Not on file     Inability: Not on file   [employer] needs     Medical: Not on file     Non-medical: Not on file   Tobacco Use    Smoking status: Never Smoker    Smokeless tobacco: Never Used   Substance and Sexual Activity    Alcohol use: Yes     Comment: occasionally     Drug use: No    Sexual activity: Not on file   Lifestyle    Physical activity     Days per week: Not on file     Minutes per session: Not on file    Stress: Not on file   Relationships    Social connections     Talks on phone: Not on file     Gets together: Not on file     Attends [employer] service: Not on file     Active member of club or organization: Not on file     Attends meetings of clubs or organizations: Not on file     Relationship status: Not on file    Intimate partner violence     Fear of current or ex partner: Not on file     Emotionally abused: Not on file     Physically abused: Not on file     Forced sexual activity: Not on file   Other Topics Concern    Not on file   Social History Narrative    Not on file        Family History   Problem Relation Age of Onset    Diabetes Mother     High Blood Pressure Father        ADVANCE DIRECTIVE: N, <no information>    Vitals:    04/26/21 0831   BP: 119/69   Pulse: 91   Resp: 20   SpO2: 98%   Weight: 165 lb 6.4 oz (75 kg)   Height: 5' 9\" (1.753 m)     Estimated body mass index is 24.43 kg/m² as calculated from the following:    Height as of this encounter: 5' 9\" (1.753 m). Weight as of this encounter: 165 lb 6.4 oz (75 kg). Physical Exam  Constitutional:       General: She is not in acute distress. Appearance: She is well-developed. She is not diaphoretic. HENT:      Head: Normocephalic and atraumatic. Neck:      Musculoskeletal: Normal range of motion and neck supple. No edema, erythema or muscular tenderness. Cardiovascular:      Rate and Rhythm: Normal rate and regular rhythm. Heart sounds: Normal heart sounds. No murmur. No friction rub. Pulmonary:      Effort: Pulmonary effort is normal. No respiratory distress. Breath sounds: Normal breath sounds. Abdominal:      General: Bowel sounds are normal.      Palpations: Abdomen is soft. Tenderness: There is no abdominal tenderness. Musculoskeletal: Normal range of motion. Skin:     General: Skin is warm and dry. Capillary Refill: Capillary refill takes less than 2 seconds. Findings: No erythema or rash. Neurological:      Mental Status: She is alert and oriented to person, place, and time. Cranial Nerves: No cranial nerve deficit. Coordination: Coordination normal.      Deep Tendon Reflexes: Reflexes normal.   Psychiatric:         Behavior: Behavior normal.         Thought Content:  Thought content normal.         Judgment: Judgment normal.         No flowsheet data found. Lab Results   Component Value Date    CHOLFAST 164 03/19/2020    TRIGLYCFAST 139 03/19/2020    HDL 34 03/19/2020    LDLCALC 102 03/19/2020    GLUF 125 10/11/2016    GLUCOSE 131 11/29/2020       The ASCVD Risk score (Mauro Guzman., et al., 2013) failed to calculate for the following reasons: The 2013 ASCVD risk score is only valid for ages 36 to 78    Immunization History   Administered Date(s) Administered    Influenza Virus Vaccine 11/20/2018, 12/01/2019    Tdap (Boostrix, Adacel) 10/20/2017       Health Maintenance   Topic Date Due    HPV vaccine (1 - 2-dose series) Never done    COVID-19 Vaccine (1) Never done    Cervical cancer screen  Never done    Varicella vaccine (1 of 2 - 2-dose childhood series) 05/17/2021 (Originally [birth year]16/1996)    Hepatitis A vaccine (2 of 2 - 2-dose series) 10/26/2021 (Originally 5/10/2010)    Pneumococcal 0-64 years Vaccine (1 of 1 - PPSV23) 04/26/2022 (Originally 10/16/2000)    Hepatitis C screen  04/26/2022 (Originally 1995)    HIV screen  04/26/2022 (Originally 10/16/2009)    DTaP/Tdap/Td vaccine (2 - Td) 10/20/2027    Meningococcal (ACWY) vaccine  Completed    Flu vaccine  Completed    Hepatitis B vaccine  Aged Out    Hib vaccine  Aged Out       ASSESSMENT/PLAN:  1. Encounter for preventive health examination-no acute concerns on exam.  Blood pressure and all vitals at goal at this time. We will check preventative labs as needed for biometric screening and complete form once results available. -     CBC Auto Differential; Future  -     Comprehensive Metabolic Panel; Future  -     Lipid, Fasting; Future  -     TSH with Reflex; Future    2. Anxiety and depression-significantly improved on Wellbutrin but still having  intermittent flares primarily around her menstrual cycle. Will increase Wellbutrin to 150 mg daily and reassess in 2 to 3 months, sooner if needed. -     TSH with Reflex; Future    3.  Gastroesophageal reflux disease without esophagitis-well-controlled. Continue current Pepcid regiment without change. Course of treatment, including any medications, possible imaging, referrals, and follow ups discussed with patient. All risks and benefits and possible side effects discussed with patient who agrees to plan of care and verbalizes understanding. All labs and imaging reviewed. Please note this reports has been produced speech recognition software and may contain errors related to that system including errors in grammar, punctuation, and spelling, as well as words and phrases that may be appropriate. If there are any questions or concerns please feel free to contact the dictating provider for clarification. Return in about 3 months (around 7/26/2021). An electronic signature was used to authenticate this note.     --ELIZABETH Dong - CNP on 4/26/2021 at 9:50 AM

## 2021-07-22 RX ORDER — BUPROPION HYDROCHLORIDE 150 MG/1
TABLET ORAL
Qty: 30 TABLET | Refills: 1 | Status: SHIPPED | OUTPATIENT
Start: 2021-07-22 | End: 2021-11-12 | Stop reason: SDUPTHER

## 2021-08-11 ENCOUNTER — HOSPITAL ENCOUNTER (OUTPATIENT)
Age: 26
Setting detail: SPECIMEN
Discharge: HOME | End: 2021-08-11
Payer: COMMERCIAL

## 2021-08-11 ENCOUNTER — OFFICE VISIT (OUTPATIENT)
Dept: FAMILY MEDICINE CLINIC | Age: 26
End: 2021-08-11
Payer: COMMERCIAL

## 2021-08-11 VITALS — TEMPERATURE: 97.1 F | OXYGEN SATURATION: 100 % | HEART RATE: 80 BPM

## 2021-08-11 DIAGNOSIS — R09.81 NASAL CONGESTION: Primary | ICD-10-CM

## 2021-08-11 DIAGNOSIS — Z20.822 CLOSE EXPOSURE TO COVID-19 VIRUS: ICD-10-CM

## 2021-08-11 DIAGNOSIS — J02.9 SORE THROAT: ICD-10-CM

## 2021-08-11 LAB — S PYO AG THROAT QL: NORMAL

## 2021-08-11 PROCEDURE — 99213 OFFICE O/P EST LOW 20 MIN: CPT | Performed by: NURSE PRACTITIONER

## 2021-08-11 PROCEDURE — 87880 STREP A ASSAY W/OPTIC: CPT | Performed by: NURSE PRACTITIONER

## 2021-08-11 PROCEDURE — U0005 INFEC AGEN DETEC AMPLI PROBE: HCPCS

## 2021-08-11 PROCEDURE — U0003 INFECTIOUS AGENT DETECTION BY NUCLEIC ACID (DNA OR RNA); SEVERE ACUTE RESPIRATORY SYNDROME CORONAVIRUS 2 (SARS-COV-2) (CORONAVIRUS DISEASE [COVID-19]), AMPLIFIED PROBE TECHNIQUE, MAKING USE OF HIGH THROUGHPUT TECHNOLOGIES AS DESCRIBED BY CMS-2020-01-R: HCPCS

## 2021-08-11 NOTE — PROGRESS NOTES
8/11/21  Cat Roch  1995    FLU/COVID-19 CLINIC EVALUATION    HPI SYMPTOMS: Exposed to Thomas (+) Mom    Employer: [address]    [] Fevers  [] Chills  [] Cough  [] Coughing up blood  [] Chest Congestion  [x] Nasal Congestion  [] Feeling short of breath  [] Sometimes  [] Frequently  [] All the time  [] Chest pain  [] Headaches  []Tolerable  [] Severe  [x] Sore throat  [] Muscle aches  [] Nausea  [] Vomiting  []Unable to keep fluids down  [] Diarrhea  []Severe    [] OTHER SYMPTOMS:      Symptom Duration:   [] 1  [x] 2   [] 3   [] 4    [] 5   [] 6   [] 7   [] 8   [] 9   [] 10   [] 11   [] 12   [] 13   [] 14   [] Longer than 14 days    Symptom course:   [] Worsening     [] Stable     [x] Improving    RISK FACTORS:    [] Pregnant or possibly pregnant  [] Age over 61  [] Diabetes  [] Heart disease  [] Asthma  [] COPD/Other chronic lung diseases  [] Active Cancer  [] On Chemotherapy  [] Taking oral steroids  [] History Lymphoma/Leukemia  [x] Close contact with a lab confirmed COVID-19 patient within 14 days of symptom onset  [] History of travel from affected geographical areas within 14 days of symptom onset       VITALS:  There were no vitals filed for this visit. TESTS:    POCT FLU:  [] Positive     []Negative    ASSESSMENT:    [] Flu  [] Possible COVID-19  [] Strep    PLAN:    [] Discharge home with written instructions for:  [] Flu management  [] Possible COVID-19 infection with self-quarantine and management of symptoms  [] Follow-up with primary care physician or emergency department if worsens  [] Evaluation per physician/NP/PA in clinic  [] Sent to ER       An  electronic signature was used to authenticate this note.      --Enrique Ashraf LPN on 8/36/8836 at 6:34 PM

## 2021-08-11 NOTE — PROGRESS NOTES
8/11/2021    HPI:  Chief complaint and history of present illness as per medical assistant/nurse documented today in the Flu/COVID-19 clinic. Patient is here with complaints of nasal congestion and sore throat x 2 days. Patient states she has been in close contact with her mom who has tested positive for covid. MEDICATIONS:  Prior to Visit Medications    Medication Sig Taking?  Authorizing Provider   buPROPion (WELLBUTRIN XL) 150 MG extended release tablet TAKE ONE TABLET BY MOUTH EVERY MORNING  ELIZABETH Choi CNP   famotidine (PEPCID) 40 MG tablet TAKE ONE TABLET BY MOUTH EVERY EVENING  ELIZABETH Choi CNP       No Known Allergies,   Past Medical History:   Diagnosis Date    Common bile duct stone     per old chart pt had gallbladder removed 1/2017-  had MRCP at [facility] 6/23/2017- dx with distal CBD stone- for ERCP 6/28/2017    Depression     GERD (gastroesophageal reflux disease)    ,   Past Surgical History:   Procedure Laterality Date    CHOLECYSTECTOMY  01/2017    ERCP  06/28/2017    removal of CBD stone   ,   Social History     Tobacco Use    Smoking status: Never Smoker    Smokeless tobacco: Never Used   Vaping Use    Vaping Use: Never used   Substance Use Topics    Alcohol use: Yes     Comment: occasionally     Drug use: No   ,   Family History   Problem Relation Age of Onset    Diabetes Mother     High Blood Pressure Father    ,   Immunization History   Administered Date(s) Administered    Influenza Virus Vaccine 11/20/2018, 12/01/2019    Tdap (Boostrix, Adacel) 10/20/2017   ,   Health Maintenance   Topic Date Due    Varicella vaccine (1 of 2 - 2-dose childhood series) Never done    HPV vaccine (1 - 2-dose series) Never done    COVID-19 Vaccine (1) Never done    Cervical cancer screen  Never done    Hepatitis A vaccine (2 of 2 - 2-dose series) 10/26/2021 (Originally 5/10/2010)    Hepatitis C screen  04/26/2022 (Originally 1995)    HIV screen  04/26/2022 (Originally 10/16/2009)    Flu vaccine (1) 09/01/2021    DTaP/Tdap/Td vaccine (2 - Td or Tdap) 10/20/2027    Meningococcal (ACWY) vaccine  Completed    Hepatitis B vaccine  Aged Out    Hib vaccine  Aged Out    Pneumococcal 0-64 years Vaccine  Aged Out       PHYSICAL EXAM:  Physical Exam  Constitutional:       Appearance: Normal appearance. HENT:      Head: Normocephalic. Right Ear: Tympanic membrane, ear canal and external ear normal.      Left Ear: Tympanic membrane, ear canal and external ear normal.      Nose: Congestion present. Mouth/Throat:      Lips: Pink. Mouth: Mucous membranes are moist.      Pharynx: Posterior oropharyngeal erythema present. Cardiovascular:      Rate and Rhythm: Normal rate and regular rhythm. Heart sounds: Normal heart sounds. Pulmonary:      Effort: Pulmonary effort is normal.      Breath sounds: Normal breath sounds. Musculoskeletal:      Cervical back: Neck supple. Skin:     General: Skin is warm and dry. Neurological:      Mental Status: She is alert and oriented to person, place, and time. Psychiatric:         Mood and Affect: Mood normal.         Behavior: Behavior normal.         ASSESSMENT/PLAN:  1. Nasal congestion  Your COVID 19 test can take 3-5 days for the results to come back. We ask that you make a Mychart page and view your test results this way. You will need to Self quarantine until you know your results. Increase fluids and rest  Saline nasal spray as needed for nasal congestion  Warm salt gargles as needed for throat discomfort  Monitor temperature twice a day  Tylenol as needed for fevers and/or discomfort. Big deep breaths periodically throughout the day  Regular Mucinex over the counter as needed for chest congestion  If symptoms worsen -Go to the ER. Follow up with your primary care provider  - Covid-19 Ambulatory    2. Sore throat  Strep test is negative. - Covid-19 Ambulatory  - POCT rapid strep A    3.  Close exposure to COVID-19 virus  - Covid-19 Ambulatory      FOLLOW-UP:  Return if symptoms worsen or fail to improve.     In addition to other information, the printed after visit summary provided to the patient includes:  [x] COVID-19 Self care instructions  [x] COVID-19 General patient information

## 2021-08-11 NOTE — PATIENT INSTRUCTIONS
Your COVID 19 test can take 3-5 days for the results to come back. We ask that you make a Mychart page and view your test results this way. You will need to Self quarantine until you know your results. Increase fluids and rest  Saline nasal spray as needed for nasal congestion  Warm salt gargles as needed for throat discomfort  Monitor temperature twice a day  Tylenol as needed for fevers and/or discomfort. Big deep breaths periodically throughout the day  Regular Mucinex over the counter as needed for chest congestion  If symptoms worsen -Go to the ER. Follow up with your primary care provider      To Whom it May Concern:    Matt Jerez was tested for COVID-19 8/11/2021. He/she must stay home until test results are back. If test is positive, he/she must quarantine for a total of 10 days starting from day one of symptom onset. He/she must also be fever-free for 24 hours at that time, and also have improvement in symptoms. We do not recommend retesting as patients may continue to test positive for months even though no longer contagious. It is suggested you call [address] or [address] [place] [address] with any questions regarding quarantine timeframe/return to work/school details.

## 2021-08-12 LAB — SARS-COV-2: NOT DETECTED

## 2021-08-25 ENCOUNTER — OFFICE VISIT (OUTPATIENT)
Dept: FAMILY MEDICINE CLINIC | Age: 26
End: 2021-08-25
Payer: COMMERCIAL

## 2021-08-25 ENCOUNTER — HOSPITAL ENCOUNTER (OUTPATIENT)
Age: 26
Setting detail: SPECIMEN
Discharge: HOME | End: 2021-08-25
Payer: COMMERCIAL

## 2021-08-25 DIAGNOSIS — J06.9 VIRAL URI: Primary | ICD-10-CM

## 2021-08-25 PROCEDURE — 99213 OFFICE O/P EST LOW 20 MIN: CPT | Performed by: NURSE PRACTITIONER

## 2021-08-25 PROCEDURE — U0005 INFEC AGEN DETEC AMPLI PROBE: HCPCS

## 2021-08-25 PROCEDURE — U0003 INFECTIOUS AGENT DETECTION BY NUCLEIC ACID (DNA OR RNA); SEVERE ACUTE RESPIRATORY SYNDROME CORONAVIRUS 2 (SARS-COV-2) (CORONAVIRUS DISEASE [COVID-19]), AMPLIFIED PROBE TECHNIQUE, MAKING USE OF HIGH THROUGHPUT TECHNOLOGIES AS DESCRIBED BY CMS-2020-01-R: HCPCS

## 2021-08-25 NOTE — PROGRESS NOTES
8/25/21  Arin Baig  1995    FLU/COVID-19 CLINIC EVALUATION    HPI SYMPTOMS:    Employer:    [x] Fevers  [x] Chills  [] Cough  [] Coughing up blood  [x] Chest Congestion  [] Nasal Congestion  [] Feeling short of breath  [] Sometimes  [] Frequently  [] All the time  [x] Chest pain  [x] Headaches  []Tolerable  [] Severe  [x] Sore throat  [x] Muscle aches  [] Nausea  [] Vomiting  []Unable to keep fluids down  [] Diarrhea  []Severe    [x] OTHER SYMPTOMS:   Fatigue    Symptom Duration:   [] 1  [x] 2   [] 3   [] 4    [] 5   [] 6   [] 7   [] 8   [] 9   [] 10   [] 11   [] 12   [] 13   [] 14   [] Longer than 14 days    Symptom course:   [] Worsening     [x] Stable     [] Improving    RISK FACTORS:    [] Pregnant or possibly pregnant  [] Age over 61  [] Diabetes  [] Heart disease  [] Asthma  [] COPD/Other chronic lung diseases  [] Active Cancer  [] On Chemotherapy  [] Taking oral steroids  [] History Lymphoma/Leukemia  [] Close contact with a lab confirmed COVID-19 patient within 14 days of symptom onset  [] History of travel from affected geographical areas within 14 days of symptom onset       VITALS:  There were no vitals filed for this visit. TESTS:    POCT FLU:  [] Positive     []Negative    ASSESSMENT:    [] Flu  [] Possible COVID-19  [] Strep    PLAN:    [] Discharge home with written instructions for:  [] Flu management  [] Possible COVID-19 infection with self-quarantine and management of symptoms  [] Follow-up with primary care physician or emergency department if worsens  [] Evaluation per physician/NP/PA in clinic  [] Sent to ER       An  electronic signature was used to authenticate this note.      --Susana Gottron, MA on 8/25/2021 at 11:27 AM

## 2021-08-25 NOTE — PROGRESS NOTES
8/25/2021    HPI:  Chief complaint and history of present illness as per medical assistant/nurse documented today in the Flu/COVID-19 clinic. MEDICATIONS:  Prior to Visit Medications    Medication Sig Taking? Authorizing Provider   buPROPion (WELLBUTRIN XL) 150 MG extended release tablet TAKE ONE TABLET BY MOUTH EVERY MORNING  ELIZABETH Guillen CNP   famotidine (PEPCID) 40 MG tablet TAKE ONE TABLET BY MOUTH EVERY EVENING  ELIZABETH Guillen CNP       No Known Allergies    PHYSICAL EXAM:  Physical Exam  HENT:      Head: Normocephalic and atraumatic. Nose: Congestion present. Mouth/Throat:      Pharynx: Oropharyngeal exudate and posterior oropharyngeal erythema present. Eyes:      Pupils: Pupils are equal, round, and reactive to light. Cardiovascular:      Rate and Rhythm: Regular rhythm. Tachycardia present. Pulses: Normal pulses. Heart sounds: Normal heart sounds. Pulmonary:      Effort: Pulmonary effort is normal.      Breath sounds: Normal breath sounds. Musculoskeletal:      Cervical back: Normal range of motion. No rigidity or tenderness. Skin:     General: Skin is warm and dry. Neurological:      General: No focal deficit present. Mental Status: She is alert and oriented to person, place, and time. Psychiatric:         Mood and Affect: Mood normal.         Behavior: Behavior normal.       Temp:  96.9  Heart Rate:  118    Pulse Ox:  99        ASSESSMENT/PLAN:  1. Viral URI  Congestion, erythematous pharynx, and rhinorrhea noted. No cough, shortness of breath, or wheezing.  - Covid test completed. Stay hydrated and remain in quarantine until results are received. FOLLOW-UP:  No follow-ups on file.     In addition to other information, the printed after visit summary provided to the patient includes:  [x] COVID-19 Self care instructions  [x] COVID-19 General patient information    Belinda Pitt

## 2021-08-26 ENCOUNTER — TELEPHONE (OUTPATIENT)
Dept: INTERNAL MEDICINE CLINIC | Age: 26
End: 2021-08-26

## 2021-08-26 LAB
SARS-COV-2: NOT DETECTED
SOURCE: NORMAL

## 2021-11-12 ENCOUNTER — VIRTUAL VISIT (OUTPATIENT)
Dept: INTERNAL MEDICINE CLINIC | Age: 26
End: 2021-11-12
Payer: COMMERCIAL

## 2021-11-12 DIAGNOSIS — E78.00 ELEVATED LDL CHOLESTEROL LEVEL: ICD-10-CM

## 2021-11-12 DIAGNOSIS — K21.9 GASTROESOPHAGEAL REFLUX DISEASE WITHOUT ESOPHAGITIS: ICD-10-CM

## 2021-11-12 DIAGNOSIS — F41.9 ANXIETY AND DEPRESSION: Primary | ICD-10-CM

## 2021-11-12 DIAGNOSIS — F32.A ANXIETY AND DEPRESSION: Primary | ICD-10-CM

## 2021-11-12 PROCEDURE — 99214 OFFICE O/P EST MOD 30 MIN: CPT | Performed by: NURSE PRACTITIONER

## 2021-11-12 RX ORDER — BUPROPION HYDROCHLORIDE 150 MG/1
TABLET ORAL
Qty: 30 TABLET | Refills: 5 | Status: SHIPPED | OUTPATIENT
Start: 2021-11-12 | End: 2022-05-12 | Stop reason: SDUPTHER

## 2021-11-12 ASSESSMENT — ENCOUNTER SYMPTOMS
APNEA: 0
SINUS PAIN: 0
CHEST TIGHTNESS: 0
ABDOMINAL PAIN: 0
SINUS PRESSURE: 0
NAUSEA: 0
DIARRHEA: 0
SHORTNESS OF BREATH: 0
COLOR CHANGE: 0
VOMITING: 0
COUGH: 0

## 2021-11-12 ASSESSMENT — PATIENT HEALTH QUESTIONNAIRE - PHQ9
SUM OF ALL RESPONSES TO PHQ9 QUESTIONS 1 & 2: 0
SUM OF ALL RESPONSES TO PHQ QUESTIONS 1-9: 0
1. LITTLE INTEREST OR PLEASURE IN DOING THINGS: 0
SUM OF ALL RESPONSES TO PHQ QUESTIONS 1-9: 0
2. FEELING DOWN, DEPRESSED OR HOPELESS: 0
SUM OF ALL RESPONSES TO PHQ QUESTIONS 1-9: 0

## 2021-11-12 NOTE — PROGRESS NOTES
2021    TELEHEALTH EVALUATION -- Audio/Visual (During XWXLT-[id number] public health emergency)    HPI:    Selena Najera (:  1995) has requested an audio/video evaluation for the following concern(s):    Patient's depression is reasonably well controlled with current treatment. Patient denies any suicidal ideation, homicidal ideation, hallucinations. She continues to remain at goal emotionally on current Wellbutrin 150 mg daily. Patient's reflux well controlled on current medications. Patient denies any blood in stool black stool, heartburn, reflux. Denies issues with frequent coughing or reflux while sleeping. Able to eat and drink appropriately without complications. has not been needing the Pepcid and recently only using PRN Tums on a rare basis.      Lab Results   Component Value Date    LDLCALC 104 (H) 2021     Review of Systems   Constitutional: Negative for activity change, appetite change, fatigue and fever. HENT: Negative for congestion, nosebleeds, sinus pressure and sinus pain. Respiratory: Negative for apnea, cough, chest tightness and shortness of breath. Cardiovascular: Negative for chest pain and palpitations. Gastrointestinal: Negative for abdominal pain, diarrhea, nausea and vomiting. Genitourinary: Negative for difficulty urinating, flank pain and hematuria. Musculoskeletal: Negative for arthralgias, joint swelling and myalgias. Skin: Negative for color change and rash. Neurological: Negative for dizziness, light-headedness and headaches. Psychiatric/Behavioral: Negative. Negative for behavioral problems. Prior to Visit Medications    Medication Sig Taking?  Authorizing Provider   buPROPion (WELLBUTRIN XL) 150 MG extended release tablet TAKE ONE TABLET BY MOUTH EVERY MORNING Yes ELIZABETH Garcia CNP   famotidine (PEPCID) 40 MG tablet TAKE ONE TABLET BY MOUTH EVERY EVENING Yes ELIZABETH Garcia CNP       Social History     Tobacco Use    Smoking status: Never Smoker    Smokeless tobacco: Never Used   Vaping Use    Vaping Use: Never used   Substance Use Topics    Alcohol use: Yes     Comment: occasionally     Drug use: No        No Known Allergies,   Past Medical History:   Diagnosis Date    Common bile duct stone     per old chart pt had gallbladder removed 1/2017-  had MRCP at [facility] 6/23/2017- dx with distal CBD stone- for ERCP 6/28/2017    Depression     GERD (gastroesophageal reflux disease)    ,   Past Surgical History:   Procedure Laterality Date    CHOLECYSTECTOMY  01/2017    ERCP  06/28/2017    removal of CBD stone   ,   Social History     Tobacco Use    Smoking status: Never Smoker    Smokeless tobacco: Never Used   Vaping Use    Vaping Use: Never used   Substance Use Topics    Alcohol use: Yes     Comment: occasionally     Drug use: No       PHYSICAL EXAMINATION:  [ INSTRUCTIONS:  \"[x]\" Indicates a positive item  \"[]\" Indicates a negative item  -- DELETE ALL ITEMS NOT EXAMINED]  Vital Signs: (As obtained by patient/caregiver or practitioner observation)    Blood pressure-  Heart rate-    Respiratory rate-    Temperature-  Pulse oximetry-     Constitutional: [x] Appears well-developed and well-nourished [x] No apparent distress      [] Abnormal-   Mental status  [x] Alert and awake  [x] Oriented to person/place/time [x]Able to follow commands      Eyes:  EOM    [x]  Normal  [] Abnormal-  Sclera  [x]  Normal  [] Abnormal -         Discharge [x]  None visible  [] Abnormal -    HENT:   [x] Normocephalic, atraumatic.   [] Abnormal   [x] Mouth/Throat: Mucous membranes are moist.     External Ears [x] Normal  [] Abnormal-     Neck: [x] No visualized mass     Pulmonary/Chest: [x] Respiratory effort normal.  [x] No visualized signs of difficulty breathing or respiratory distress        [] Abnormal-      Musculoskeletal:   [x] Normal gait with no signs of ataxia         [x] Normal range of motion of neck        [] Abnormal- Neurological:        [x] No Facial Asymmetry (Cranial nerve 7 motor function) (limited exam to video visit)          [x] No gaze palsy        [] Abnormal-         Skin:        [x] No significant exanthematous lesions or discoloration noted on facial skin         [] Abnormal-            Psychiatric:       [x] Normal Affect [x] No Hallucinations        [] Abnormal-     Other pertinent observable physical exam findings-     ASSESSMENT/PLAN:  1. Anxiety and depression- remains well controlled on current Wellbutrin regiment. Will make no changes; refills provided. 2. Gastroesophageal reflux disease without esophagitis- no longer needed H2 blocker; TUMs needed very sparingly. No concerning symptoms. She will let me know should this issue change. 3. Elevated LDL cholesterol level- diet and exercise measures discussed at length; recheck lipid panel in 6 months with annual physical.     Course of treatment, including any medications, possible imaging, referrals, and follow ups discussed with patient. All risks and benefits and possible side effects discussed with patient who agrees to plan of care and verbalizes understanding. All labs and imaging reviewed. Return in about 6 months (around 5/12/2022). Todd Cleary, was evaluated through a synchronous (real-time) audio-video encounter. The patient (or guardian if applicable) is aware that this is a billable service. Verbal consent to proceed has been obtained within the past 12 months. The visit was conducted pursuant to the emergency declaration under the [facility], [address] [place] [address] authority and the [employer] General Act. Patient identification was verified, and a caregiver was present when appropriate. The patient was located in a state where the provider was credentialed to provide care.     Total time spent on this encounter: 25 minutes    --ELIZABETH Astorga - CNP on 11/12/2021 at 11:51 AM    An electronic signature was used to authenticate this note.

## 2022-05-12 ENCOUNTER — OFFICE VISIT (OUTPATIENT)
Dept: INTERNAL MEDICINE CLINIC | Age: 27
End: 2022-05-12
Payer: COMMERCIAL

## 2022-05-12 VITALS
OXYGEN SATURATION: 99 % | WEIGHT: 166 LBS | HEART RATE: 84 BPM | DIASTOLIC BLOOD PRESSURE: 71 MMHG | RESPIRATION RATE: 20 BRPM | HEIGHT: 69 IN | SYSTOLIC BLOOD PRESSURE: 112 MMHG | BODY MASS INDEX: 24.59 KG/M2

## 2022-05-12 DIAGNOSIS — F41.9 ANXIETY AND DEPRESSION: ICD-10-CM

## 2022-05-12 DIAGNOSIS — K21.9 GASTROESOPHAGEAL REFLUX DISEASE WITHOUT ESOPHAGITIS: ICD-10-CM

## 2022-05-12 DIAGNOSIS — Z00.00 ENCOUNTER FOR PREVENTIVE HEALTH EXAMINATION: Primary | ICD-10-CM

## 2022-05-12 DIAGNOSIS — F32.A ANXIETY AND DEPRESSION: ICD-10-CM

## 2022-05-12 PROCEDURE — 99395 PREV VISIT EST AGE 18-39: CPT | Performed by: NURSE PRACTITIONER

## 2022-05-12 RX ORDER — BUPROPION HYDROCHLORIDE 150 MG/1
TABLET ORAL
Qty: 30 TABLET | Refills: 5 | Status: SHIPPED | OUTPATIENT
Start: 2022-05-12 | End: 2022-09-19

## 2022-05-12 SDOH — ECONOMIC STABILITY: FOOD INSECURITY: WITHIN THE PAST 12 MONTHS, THE FOOD YOU BOUGHT JUST DIDN'T LAST AND YOU DIDN'T HAVE MONEY TO GET MORE.: NEVER TRUE

## 2022-05-12 SDOH — ECONOMIC STABILITY: FOOD INSECURITY: WITHIN THE PAST 12 MONTHS, YOU WORRIED THAT YOUR FOOD WOULD RUN OUT BEFORE YOU GOT MONEY TO BUY MORE.: NEVER TRUE

## 2022-05-12 ASSESSMENT — ENCOUNTER SYMPTOMS
VOMITING: 0
COLOR CHANGE: 0
COUGH: 0
ABDOMINAL PAIN: 0
DIARRHEA: 0
SINUS PRESSURE: 0
SINUS PAIN: 0
APNEA: 0
NAUSEA: 0
CHEST TIGHTNESS: 0
SHORTNESS OF BREATH: 0

## 2022-05-12 ASSESSMENT — SOCIAL DETERMINANTS OF HEALTH (SDOH): HOW HARD IS IT FOR YOU TO PAY FOR THE VERY BASICS LIKE FOOD, HOUSING, MEDICAL CARE, AND HEATING?: NOT HARD AT ALL

## 2022-05-12 NOTE — PROGRESS NOTES
2022    Bala Garcia (:  1995) is a 31 y.o. female, here for a preventive medicine evaluation. Patient Active Problem List   Diagnosis    GERD (gastroesophageal reflux disease)    Anxiety and depression     Patient's depression is reasonably well controlled with current treatment. Patient denies any suicidal ideation, homicidal ideation, hallucinations. Mood remains stable on current Wellbutrin 150 mg daily. Denies any concerns about her emotional health at this time. Patient's reflux well controlled on current medications. Patient denies any blood in stool black stool, heartburn, reflux. Denies issues with frequent coughing or reflux while sleeping. Able to eat and drink appropriately without complications. Review of Systems   Constitutional: Negative for activity change, appetite change, fatigue and fever. HENT: Negative for congestion, nosebleeds, sinus pressure and sinus pain. Respiratory: Negative for apnea, cough, chest tightness and shortness of breath. Cardiovascular: Negative for chest pain and palpitations. Gastrointestinal: Negative for abdominal pain, diarrhea, nausea and vomiting. Genitourinary: Negative for difficulty urinating, flank pain and hematuria. Musculoskeletal: Negative for arthralgias, joint swelling and myalgias. Skin: Negative for color change and rash. Neurological: Negative for dizziness, light-headedness and headaches. Psychiatric/Behavioral: Negative. Negative for behavioral problems. Prior to Visit Medications    Medication Sig Taking?  Authorizing Provider   buPROPion (WELLBUTRIN XL) 150 MG extended release tablet TAKE ONE TABLET BY MOUTH EVERY MORNING Yes ELIZABETH Carrero - CNP        No Known Allergies    Past Medical History:   Diagnosis Date    Common bile duct stone     per old chart pt had gallbladder removed 2017-  had MRCP at [facility] 2017- dx with distal CBD stone- for ERCP 2017    Depression     GERD (gastroesophageal reflux disease)        Past Surgical History:   Procedure Laterality Date    CHOLECYSTECTOMY  01/2017    ERCP  06/28/2017    removal of CBD stone       Social History     Socioeconomic History    Marital status: Single     Spouse name: Not on file    Number of children: Not on file    Years of education: Not on file    Highest education level: Not on file   Occupational History    Occupation:      Comment: [employer] services   Tobacco Use    Smoking status: Never Smoker    Smokeless tobacco: Never Used   Vaping Use    Vaping Use: Never used   Substance and Sexual Activity    Alcohol use: Yes     Comment: occasionally     Drug use: No    Sexual activity: Not on file   Other Topics Concern    Not on file   Social History Narrative    Not on file     Social Determinants of Health     Financial Resource Strain: Low Risk     Difficulty of Paying Living Expenses: Not hard at all   Food Insecurity: No Food Insecurity    Worried About [address] in the Last Year: Never true    [facility] in the Last Year: Never true   Transportation Needs:     Lack of Transportation (Medical): Not on file    Lack of Transportation (Non-Medical):  Not on file   Physical Activity:     Days of Exercise per Week: Not on file    Minutes of Exercise per Session: Not on file   Stress:     Feeling of Stress : Not on file   Social Connections:     Frequency of Communication with Friends and Family: Not on file    Frequency of Social Gatherings with Friends and Family: Not on file    Attends Yazidi Services: Not on file    Active Member of Clubs or Organizations: Not on file    Attends Club or Organization Meetings: Not on file    Marital Status: Not on file   Intimate Partner Violence:     Fear of Current or Ex-Partner: Not on file    Emotionally Abused: Not on file    Physically Abused: Not on file    Sexually Abused: Not on file   Housing Stability:  Unable to Pay for Housing in the Last Year: Not on file    Number of Places Lived in the Last Year: Not on file    Unstable Housing in the Last Year: Not on file        Family History   Problem Relation Age of Onset    Diabetes Mother     High Blood Pressure Father        ADVANCE DIRECTIVE: N, <no information>    Vitals:    05/12/22 0801   BP: 112/71   Pulse: 84   Resp: 20   SpO2: 99%   Weight: 166 lb (75.3 kg)   Height: 5' 9\" (1.753 m)     Estimated body mass index is 24.51 kg/m² as calculated from the following:    Height as of this encounter: 5' 9\" (1.753 m). Weight as of this encounter: 166 lb (75.3 kg). Physical Exam  Constitutional:       General: She is not in acute distress. Appearance: She is well-developed. She is not diaphoretic. HENT:      Head: Normocephalic and atraumatic. Nose: Nose normal.      Mouth/Throat:      Pharynx: No oropharyngeal exudate. Eyes:      Conjunctiva/sclera: Conjunctivae normal.      Pupils: Pupils are equal, round, and reactive to light. Cardiovascular:      Rate and Rhythm: Normal rate and regular rhythm. Heart sounds: Normal heart sounds. No murmur heard. No friction rub. Pulmonary:      Effort: Pulmonary effort is normal. No respiratory distress. Breath sounds: Normal breath sounds. Abdominal:      General: Bowel sounds are normal.      Palpations: Abdomen is soft. Tenderness: There is no abdominal tenderness. Musculoskeletal:         General: Normal range of motion. Cervical back: Normal range of motion and neck supple. No edema or erythema. No muscular tenderness. Skin:     General: Skin is warm and dry. Capillary Refill: Capillary refill takes less than 2 seconds. Findings: No erythema or rash. Neurological:      Mental Status: She is alert and oriented to person, place, and time. Cranial Nerves: No cranial nerve deficit.       Coordination: Coordination normal.      Deep Tendon Reflexes: Reflexes normal.   Psychiatric:         Behavior: Behavior normal.         Thought Content: Thought content normal.         Judgment: Judgment normal.         No flowsheet data found. Lab Results   Component Value Date    CHOLFAST 172 04/26/2021    CHOLFAST 164 03/19/2020    TRIGLYCFAST 164 04/26/2021    TRIGLYCFAST 139 03/19/2020    HDL 35 04/26/2021    HDL 34 03/19/2020    LDLCALC 104 04/26/2021    LDLCALC 102 03/19/2020    GLUF 125 10/11/2016    GLUCOSE 87 04/26/2021       The ASCVD Risk score (Sveta Payan, et al., 2013) failed to calculate for the following reasons: The 2013 ASCVD risk score is only valid for ages 36 to 78    Immunization History   Administered Date(s) Administered    COVID-19, Pfizer Purple top, DILUTE for use, 12+ yrs, 30mcg/0.3mL dose 12/24/2021, 01/21/2022    Influenza Virus Vaccine 11/20/2018, 12/01/2019    Tdap (Boostrix, Adacel) 10/20/2017       Health Maintenance   Topic Date Due    Hepatitis A vaccine (2 of 2 - 2-dose series) 05/10/2010    Pap smear  Never done    Varicella vaccine (1 of 2 - 2-dose childhood series) 06/02/2022 (Originally 10/16/1996)    COVID-19 Vaccine (3 - Booster for Spectafy series) 06/21/2022    Flu vaccine (Season Ended) 09/01/2022    Depression Monitoring  11/12/2022    DTaP/Tdap/Td vaccine (2 - Td or Tdap) 10/20/2027    Meningococcal (ACWY) vaccine  Completed    Hepatitis B vaccine  Aged Out    Hib vaccine  Aged Out    Pneumococcal 0-64 years Vaccine  Aged Out    Hepatitis C screen  Discontinued    HIV screen  Discontinued       Assessment & Plan   Encounter for preventive health examination - no acute concerns on exam; BP and all vitals at goal; check preventative labs as below and will address any concerns. -     CBC with Auto Differential; Future  -     Comprehensive Metabolic Panel; Future  -     Lipid, Fasting; Future  -     TSH with Reflex;  Future    Anxiety and depression- continue wellbutrin at current dose without change; refills provided today. Gastroesophageal reflux disease without esophagitis- stable off of meds; she shall let us know should this issue worsen. Return in about 6 months (around 11/12/2022).          --ELIZABETH Lyons - CNP

## 2022-09-15 ENCOUNTER — OFFICE VISIT (OUTPATIENT)
Dept: OBGYN | Age: 27
End: 2022-09-15
Payer: COMMERCIAL

## 2022-09-15 ENCOUNTER — HOSPITAL ENCOUNTER (OUTPATIENT)
Age: 27
Setting detail: SPECIMEN
Discharge: HOME | End: 2022-09-15
Payer: COMMERCIAL

## 2022-09-15 VITALS
SYSTOLIC BLOOD PRESSURE: 134 MMHG | BODY MASS INDEX: 25.92 KG/M2 | HEIGHT: 69 IN | DIASTOLIC BLOOD PRESSURE: 80 MMHG | WEIGHT: 175 LBS

## 2022-09-15 DIAGNOSIS — Z01.419 ENCOUNTER FOR ANNUAL ROUTINE GYNECOLOGICAL EXAMINATION: Primary | ICD-10-CM

## 2022-09-15 PROCEDURE — 99385 PREV VISIT NEW AGE 18-39: CPT | Performed by: OBSTETRICS & GYNECOLOGY

## 2022-09-15 PROCEDURE — 88142 CYTOPATH C/V THIN LAYER: CPT

## 2022-09-15 RX ORDER — CHOLECALCIFEROL (VITAMIN D3) 25 MCG
1 TABLET,CHEWABLE ORAL DAILY
Qty: 90 CAPSULE | Refills: 3 | Status: SHIPPED | OUTPATIENT
Start: 2022-09-15

## 2022-09-15 ASSESSMENT — ENCOUNTER SYMPTOMS
GASTROINTESTINAL NEGATIVE: 1
EYES NEGATIVE: 1
ALLERGIC/IMMUNOLOGIC NEGATIVE: 1
RESPIRATORY NEGATIVE: 1

## 2022-09-15 ASSESSMENT — PATIENT HEALTH QUESTIONNAIRE - PHQ9
SUM OF ALL RESPONSES TO PHQ QUESTIONS 1-9: 0
7. TROUBLE CONCENTRATING ON THINGS, SUCH AS READING THE NEWSPAPER OR WATCHING TELEVISION: 1
SUM OF ALL RESPONSES TO PHQ QUESTIONS 1-9: 0
SUM OF ALL RESPONSES TO PHQ QUESTIONS 1-9: 3
SUM OF ALL RESPONSES TO PHQ QUESTIONS 1-9: 0
1. LITTLE INTEREST OR PLEASURE IN DOING THINGS: 0
SUM OF ALL RESPONSES TO PHQ QUESTIONS 1-9: 3
4. FEELING TIRED OR HAVING LITTLE ENERGY: 1
2. FEELING DOWN, DEPRESSED OR HOPELESS: 0
SUM OF ALL RESPONSES TO PHQ9 QUESTIONS 1 & 2: 0
SUM OF ALL RESPONSES TO PHQ9 QUESTIONS 1 & 2: 1
SUM OF ALL RESPONSES TO PHQ QUESTIONS 1-9: 3
1. LITTLE INTEREST OR PLEASURE IN DOING THINGS: 0
SUM OF ALL RESPONSES TO PHQ QUESTIONS 1-9: 3
9. THOUGHTS THAT YOU WOULD BE BETTER OFF DEAD, OR OF HURTING YOURSELF: 0
2. FEELING DOWN, DEPRESSED OR HOPELESS: 1
8. MOVING OR SPEAKING SO SLOWLY THAT OTHER PEOPLE COULD HAVE NOTICED. OR THE OPPOSITE, BEING SO FIGETY OR RESTLESS THAT YOU HAVE BEEN MOVING AROUND A LOT MORE THAN USUAL: 0
5. POOR APPETITE OR OVEREATING: 0
6. FEELING BAD ABOUT YOURSELF - OR THAT YOU ARE A FAILURE OR HAVE LET YOURSELF OR YOUR FAMILY DOWN: 0
3. TROUBLE FALLING OR STAYING ASLEEP: 0
SUM OF ALL RESPONSES TO PHQ QUESTIONS 1-9: 0

## 2022-09-15 NOTE — PROGRESS NOTES
9/15/22    Santiagostaci Prince  1995    Chief Complaint   Patient presents with    New Patient     Annual exam. Never had a pap. Last LMP 9/14/22. Currently sexually active. No b/c- uses condoms sometimes. Pt has no c/o.      2  The patient is a 31 y.o. female, No obstetric history on file. who presents for her annual exam.  She is menstruating normally. She is  sexually active. She is not currently taking birth control    She reports no gynecological symptoms. Pap smear history: She has not had a PAP smear in the past.    Past Medical History:   Diagnosis Date    Anxiety     Common bile duct stone     per old chart pt had gallbladder removed 1/2017-  had MRCP at [facility] 6/23/2017- dx with distal CBD stone- for ERCP 6/28/2017    Depression     GERD (gastroesophageal reflux disease)        Past Surgical History:   Procedure Laterality Date    CHOLECYSTECTOMY  01/2017    ERCP  06/28/2017    removal of CBD stone       Family History   Problem Relation Age of Onset    Diabetes Mother     High Blood Pressure Father        Social History     Tobacco Use    Smoking status: Never    Smokeless tobacco: Never   Vaping Use    Vaping Use: Never used   Substance Use Topics    Alcohol use: Yes     Comment: occasionally     Drug use: No       Current Outpatient Medications   Medication Sig Dispense Refill    Prenatal Vit-Fe Sulfate-FA-DHA (PRENATAL VITAMIN/MIN +DHA) 27-0.8-200 MG CAPS Take 1 tablet by mouth daily (may substitute any prenatal vitamin covered by insurance, ok for prenatal without DHA if DHA based prenatal is not covered) 90 capsule 3    buPROPion (WELLBUTRIN XL) 150 MG extended release tablet TAKE ONE TABLET BY MOUTH EVERY MORNING 30 tablet 5     No current facility-administered medications for this visit. No Known Allergies    No obstetric history on file.     Immunization History   Administered Date(s) Administered    COVID-19, PFIZER PURPLE top, DILUTE for use, (age 15 y+), 30mcg/0.3mL 12/24/2021, 01/21/2022    Influenza Virus Vaccine 11/20/2018, 12/01/2019    Tdap (Boostrix, Adacel) 10/20/2017       Review of Systems   Constitutional: Negative. HENT: Negative. Eyes: Negative. Respiratory: Negative. Cardiovascular: Negative. Gastrointestinal: Negative. Endocrine: Negative. Genitourinary: Negative. Musculoskeletal: Negative. Skin: Negative. Allergic/Immunologic: Negative. Neurological: Negative. Hematological: Negative. Psychiatric/Behavioral: Negative. /80   Ht 5' 9\" (1.753 m)   Wt 175 lb (79.4 kg)   LMP 09/14/2022   BMI 25.84 kg/m²     Physical Exam  Exam conducted with a chaperone present. Constitutional:       Appearance: Normal appearance. HENT:      Head: Normocephalic and atraumatic. Nose: Nose normal.   Eyes:      Conjunctiva/sclera: Conjunctivae normal.   Cardiovascular:      Rate and Rhythm: Normal rate. Pulses: Normal pulses. Pulmonary:      Effort: Pulmonary effort is normal.   Chest:   Breasts:     Right: Normal. No axillary adenopathy or supraclavicular adenopathy. Left: Normal. No axillary adenopathy or supraclavicular adenopathy. Abdominal:      General: Abdomen is flat. Bowel sounds are normal.      Palpations: Abdomen is soft. Hernia: There is no hernia in the left inguinal area or right inguinal area. Genitourinary:     General: Normal vulva. Exam position: Lithotomy position. Labia:         Right: No rash, tenderness or lesion. Left: No rash, tenderness or lesion. Urethra: No prolapse. Vagina: Normal. No foreign body. No vaginal discharge, erythema, tenderness, bleeding, lesions or prolapsed vaginal walls. Cervix: No cervical motion tenderness, discharge, friability, lesion, erythema or cervical bleeding. Uterus: Normal. Not enlarged, not tender and no uterine prolapse.        Adnexa: Right adnexa normal and left adnexa normal.        Right: No mass, tenderness or fullness. Left: No mass, tenderness or fullness. Musculoskeletal:      Cervical back: Normal range of motion and neck supple. Lymphadenopathy:      Upper Body:      Right upper body: No supraclavicular, axillary or pectoral adenopathy. Left upper body: No supraclavicular, axillary or pectoral adenopathy. Skin:     General: Skin is warm. Coloration: Skin is not ashen or cyanotic. Findings: No abrasion, abscess or bruising. Rash is not crusting or macular. Neurological:      Mental Status: She is alert and oriented to person, place, and time. No results found for this visit on 09/15/22. Assessment and Plan   Diagnosis Orders   1. Encounter for annual routine gynecological examination  Prenatal Vit-Fe Sulfate-FA-DHA (PRENATAL VITAMIN/MIN +DHA) 27-0.8-200 MG CAPS    PAP SMEAR          Return in about 1 year (around 9/15/2023).     Zachary Hood MD

## 2022-09-19 ENCOUNTER — OFFICE VISIT (OUTPATIENT)
Dept: INTERNAL MEDICINE CLINIC | Age: 27
End: 2022-09-19
Payer: COMMERCIAL

## 2022-09-19 VITALS
HEART RATE: 69 BPM | OXYGEN SATURATION: 98 % | RESPIRATION RATE: 16 BRPM | WEIGHT: 173 LBS | SYSTOLIC BLOOD PRESSURE: 110 MMHG | DIASTOLIC BLOOD PRESSURE: 68 MMHG | HEIGHT: 69 IN | BODY MASS INDEX: 25.62 KG/M2

## 2022-09-19 DIAGNOSIS — F32.A ANXIETY AND DEPRESSION: Primary | ICD-10-CM

## 2022-09-19 DIAGNOSIS — K21.9 GASTROESOPHAGEAL REFLUX DISEASE WITHOUT ESOPHAGITIS: ICD-10-CM

## 2022-09-19 DIAGNOSIS — F41.9 ANXIETY AND DEPRESSION: Primary | ICD-10-CM

## 2022-09-19 DIAGNOSIS — Z00.00 ENCOUNTER FOR PREVENTIVE HEALTH EXAMINATION: ICD-10-CM

## 2022-09-19 LAB
A/G RATIO: 2.3 (ref 1.1–2.2)
ALBUMIN SERPL-MCNC: 4.9 G/DL (ref 3.4–5)
ALP BLD-CCNC: 101 U/L (ref 40–129)
ALT SERPL-CCNC: 14 U/L (ref 10–40)
ANION GAP SERPL CALCULATED.3IONS-SCNC: 17 MMOL/L (ref 3–16)
AST SERPL-CCNC: 16 U/L (ref 15–37)
BASOPHILS ABSOLUTE: 0 K/UL (ref 0–0.2)
BASOPHILS RELATIVE PERCENT: 0.8 %
BILIRUB SERPL-MCNC: 0.3 MG/DL (ref 0–1)
BUN BLDV-MCNC: 12 MG/DL (ref 7–20)
CALCIUM SERPL-MCNC: 10.1 MG/DL (ref 8.3–10.6)
CHLORIDE BLD-SCNC: 103 MMOL/L (ref 99–110)
CHOLESTEROL, FASTING: 198 MG/DL (ref 0–199)
CO2: 23 MMOL/L (ref 21–32)
CREAT SERPL-MCNC: 0.8 MG/DL (ref 0.6–1.1)
EOSINOPHILS ABSOLUTE: 0.1 K/UL (ref 0–0.6)
EOSINOPHILS RELATIVE PERCENT: 1.3 %
GFR AFRICAN AMERICAN: >60
GFR NON-AFRICAN AMERICAN: >60
GLUCOSE BLD-MCNC: 79 MG/DL (ref 70–99)
HCT VFR BLD CALC: 44 % (ref 36–48)
HDLC SERPL-MCNC: 42 MG/DL (ref 40–60)
HEMOGLOBIN: 15.4 G/DL (ref 12–16)
LDL CHOLESTEROL CALCULATED: 116 MG/DL
LYMPHOCYTES ABSOLUTE: 1.9 K/UL (ref 1–5.1)
LYMPHOCYTES RELATIVE PERCENT: 32.3 %
MCH RBC QN AUTO: 31.4 PG (ref 26–34)
MCHC RBC AUTO-ENTMCNC: 35.1 G/DL (ref 31–36)
MCV RBC AUTO: 89.6 FL (ref 80–100)
MONOCYTES ABSOLUTE: 0.4 K/UL (ref 0–1.3)
MONOCYTES RELATIVE PERCENT: 6.4 %
NEUTROPHILS ABSOLUTE: 3.4 K/UL (ref 1.7–7.7)
NEUTROPHILS RELATIVE PERCENT: 59.2 %
PDW BLD-RTO: 13.1 % (ref 12.4–15.4)
PLATELET # BLD: 284 K/UL (ref 135–450)
PMV BLD AUTO: 8.2 FL (ref 5–10.5)
POTASSIUM SERPL-SCNC: 4.5 MMOL/L (ref 3.5–5.1)
RBC # BLD: 4.91 M/UL (ref 4–5.2)
SODIUM BLD-SCNC: 143 MMOL/L (ref 136–145)
TOTAL PROTEIN: 7 G/DL (ref 6.4–8.2)
TRIGLYCERIDE, FASTING: 198 MG/DL (ref 0–150)
TSH REFLEX: 2.12 UIU/ML (ref 0.27–4.2)
VLDLC SERPL CALC-MCNC: 40 MG/DL
WBC # BLD: 5.8 K/UL (ref 4–11)

## 2022-09-19 PROCEDURE — 36415 COLL VENOUS BLD VENIPUNCTURE: CPT | Performed by: NURSE PRACTITIONER

## 2022-09-19 PROCEDURE — 99214 OFFICE O/P EST MOD 30 MIN: CPT | Performed by: NURSE PRACTITIONER

## 2022-09-19 RX ORDER — BUPROPION HYDROCHLORIDE 300 MG/1
300 TABLET ORAL EVERY MORNING
Qty: 30 TABLET | Refills: 2 | Status: SHIPPED | OUTPATIENT
Start: 2022-09-19

## 2022-09-19 ASSESSMENT — ENCOUNTER SYMPTOMS
SHORTNESS OF BREATH: 0
NAUSEA: 0
SINUS PRESSURE: 0
COUGH: 0
VOMITING: 0
ABDOMINAL PAIN: 0
SINUS PAIN: 0
DIARRHEA: 0
CHEST TIGHTNESS: 0
APNEA: 0
COLOR CHANGE: 0

## 2022-09-19 NOTE — PROGRESS NOTES
Justice Hernandez  1995  09/19/22    Chief Complaint   Patient presents with    Annual Exam       SUBJECTIVE:      Patient's depression is reasonably well controlled with current treatment. Patient denies any suicidal ideation, homicidal ideation, hallucinations. Mood remains stable on current Wellbutrin 150 mg daily, however, over the last several weeks she has felt herself \"zoning out and more mentally exhausted and stressed. \" Is in the final stages of planning of wedding ad this has added significant stress. Patient's reflux well controlled on current medications. Patient denies any blood in stool black stool, heartburn, reflux. Denies issues with frequent coughing or reflux while sleeping. Able to eat and drink appropriately without complications. Review of Systems   Constitutional:  Negative for activity change, appetite change, fatigue and fever. HENT:  Negative for congestion, nosebleeds, sinus pressure and sinus pain. Respiratory:  Negative for apnea, cough, chest tightness and shortness of breath. Cardiovascular:  Negative for chest pain and palpitations. Gastrointestinal:  Negative for abdominal pain, diarrhea, nausea and vomiting. Genitourinary:  Negative for difficulty urinating, flank pain and hematuria. Musculoskeletal:  Negative for arthralgias, joint swelling and myalgias. Skin:  Negative for color change and rash. Neurological:  Negative for dizziness, light-headedness and headaches. Psychiatric/Behavioral:  Positive for dysphoric mood. Negative for agitation, behavioral problems, hallucinations, self-injury, sleep disturbance and suicidal ideas. The patient is nervous/anxious. The patient is not hyperactive. OBJECTIVE:    /68   Pulse 69   Resp 16   Ht 5' 9\" (1.753 m)   Wt 173 lb (78.5 kg)   LMP 09/14/2022   SpO2 98%   BMI 25.55 kg/m²     Physical Exam  Constitutional:       General: She is not in acute distress.      Appearance: She is well-developed. She is not diaphoretic. HENT:      Head: Normocephalic and atraumatic. Nose: Nose normal.      Mouth/Throat:      Pharynx: No oropharyngeal exudate. Eyes:      Conjunctiva/sclera: Conjunctivae normal.      Pupils: Pupils are equal, round, and reactive to light. Cardiovascular:      Rate and Rhythm: Normal rate and regular rhythm. Heart sounds: Normal heart sounds. No murmur heard. No friction rub. Pulmonary:      Effort: Pulmonary effort is normal. No respiratory distress. Breath sounds: Normal breath sounds. Abdominal:      General: Bowel sounds are normal.      Palpations: Abdomen is soft. Tenderness: There is no abdominal tenderness. Musculoskeletal:         General: Normal range of motion. Cervical back: Normal range of motion and neck supple. No edema or erythema. No muscular tenderness. Skin:     General: Skin is warm and dry. Capillary Refill: Capillary refill takes less than 2 seconds. Findings: No erythema or rash. Neurological:      Mental Status: She is alert and oriented to person, place, and time. Cranial Nerves: No cranial nerve deficit. Coordination: Coordination normal.      Deep Tendon Reflexes: Reflexes normal.   Psychiatric:         Behavior: Behavior normal.         Thought Content: Thought content normal.         Judgment: Judgment normal.       ASSESSMENT:    1. Anxiety and depression    2. Gastroesophageal reflux disease without esophagitis        PLAN:    Harsh Cobb was seen today for annual exam.    Diagnoses and all orders for this visit:    Anxiety and depression-we will increase Wellbutrin to 300 mg daily. Reassess in 2 to 3 months, sooner if needed. -     buPROPion (WELLBUTRIN XL) 300 MG extended release tablet; Take 1 tablet by mouth every morning TAKE ONE TABLET BY MOUTH EVERY MORNING    Gastroesophageal reflux disease without esophagitis-well controlled without medication. Will monitor for now.     Course of treatment, including any medications, possible imaging, referrals, and follow ups discussed with patient. All risks and benefits and possible side effects discussed with patient who agrees to plan of care and verbalizes understanding. All labs and imaging reviewed. No flowsheet data found. Return in about 3 months (around 12/19/2022). Pleas note this reports has been produced speech recognition software and may contain errors related to that system including errors in grammar, punctuation, and spelling, as well as words and phrases that may be appropriate. If there are any questions or concerns please feel free to contact the dictating provider for clarification.

## 2022-12-06 DIAGNOSIS — F32.A ANXIETY AND DEPRESSION: ICD-10-CM

## 2022-12-06 DIAGNOSIS — F41.9 ANXIETY AND DEPRESSION: ICD-10-CM

## 2022-12-06 RX ORDER — BUPROPION HYDROCHLORIDE 300 MG/1
TABLET ORAL
Qty: 30 TABLET | Refills: 2 | Status: SHIPPED | OUTPATIENT
Start: 2022-12-06

## 2023-01-05 ENCOUNTER — OFFICE VISIT (OUTPATIENT)
Dept: OBGYN | Age: 28
End: 2023-01-05

## 2023-01-05 VITALS
DIASTOLIC BLOOD PRESSURE: 83 MMHG | BODY MASS INDEX: 27.25 KG/M2 | WEIGHT: 184 LBS | SYSTOLIC BLOOD PRESSURE: 128 MMHG | HEIGHT: 69 IN

## 2023-01-05 DIAGNOSIS — N91.2 AMENORRHEA: Primary | ICD-10-CM

## 2023-01-05 DIAGNOSIS — R11.2 NAUSEA AND VOMITING, UNSPECIFIED VOMITING TYPE: ICD-10-CM

## 2023-01-05 LAB
CONTROL: NORMAL
PREGNANCY TEST URINE, POC: POSITIVE

## 2023-01-05 RX ORDER — DOXYLAMINE SUCCINATE AND PYRIDOXINE HYDROCHLORIDE, DELAYED RELEASE TABLETS 10 MG/10 MG 10; 10 MG/1; MG/1
1 TABLET, DELAYED RELEASE ORAL 3 TIMES DAILY PRN
Qty: 60 TABLET | Refills: 3 | Status: SHIPPED | OUTPATIENT
Start: 2023-01-05

## 2023-01-05 ASSESSMENT — ENCOUNTER SYMPTOMS
NAUSEA: 1
RESPIRATORY NEGATIVE: 1
EYES NEGATIVE: 1
ALLERGIC/IMMUNOLOGIC NEGATIVE: 1

## 2023-01-05 ASSESSMENT — PATIENT HEALTH QUESTIONNAIRE - PHQ9
SUM OF ALL RESPONSES TO PHQ QUESTIONS 1-9: 8
4. FEELING TIRED OR HAVING LITTLE ENERGY: 3
10. IF YOU CHECKED OFF ANY PROBLEMS, HOW DIFFICULT HAVE THESE PROBLEMS MADE IT FOR YOU TO DO YOUR WORK, TAKE CARE OF THINGS AT HOME, OR GET ALONG WITH OTHER PEOPLE: 1
5. POOR APPETITE OR OVEREATING: 0
9. THOUGHTS THAT YOU WOULD BE BETTER OFF DEAD, OR OF HURTING YOURSELF: 0
7. TROUBLE CONCENTRATING ON THINGS, SUCH AS READING THE NEWSPAPER OR WATCHING TELEVISION: 1
SUM OF ALL RESPONSES TO PHQ QUESTIONS 1-9: 8
SUM OF ALL RESPONSES TO PHQ QUESTIONS 1-9: 8
3. TROUBLE FALLING OR STAYING ASLEEP: 0
6. FEELING BAD ABOUT YOURSELF - OR THAT YOU ARE A FAILURE OR HAVE LET YOURSELF OR YOUR FAMILY DOWN: 0
1. LITTLE INTEREST OR PLEASURE IN DOING THINGS: 2
2. FEELING DOWN, DEPRESSED OR HOPELESS: 2
SUM OF ALL RESPONSES TO PHQ QUESTIONS 1-9: 8
8. MOVING OR SPEAKING SO SLOWLY THAT OTHER PEOPLE COULD HAVE NOTICED. OR THE OPPOSITE, BEING SO FIGETY OR RESTLESS THAT YOU HAVE BEEN MOVING AROUND A LOT MORE THAN USUAL: 0
SUM OF ALL RESPONSES TO PHQ9 QUESTIONS 1 & 2: 4

## 2023-01-05 NOTE — PROGRESS NOTES
23    Karthik Brown  1995    Chief Complaint   Patient presents with    Amenorrhea     Amenorrhea lmp 2022. Pt is taking PNV. C/o nausea x2 weeks. Karthik Brown is a 28 y.o. female who presents today for evaluation of missed menses and nause    Past Medical History:   Diagnosis Date    Amenorrhea     Anxiety     Common bile duct stone     per old chart pt had gallbladder removed 2017-  had MRCP at [facility] 2017- dx with distal CBD stone- for ERCP 2017    Depression     GERD (gastroesophageal reflux disease)     Pregnant        Past Surgical History:   Procedure Laterality Date    CHOLECYSTECTOMY  2017    ERCP  2017    removal of CBD stone       Social History     Tobacco Use    Smoking status: Never    Smokeless tobacco: Never   Vaping Use    Vaping Use: Never used   Substance Use Topics    Alcohol use: Yes     Comment: occasionally     Drug use: No       Family History   Problem Relation Age of Onset    Diabetes Mother     High Blood Pressure Father        Current Outpatient Medications   Medication Sig Dispense Refill    doxylamine-pyridoxine 10-10 MG TBEC Take 1 tablet by mouth 3 times daily as needed (nause) 60 tablet 3    buPROPion (WELLBUTRIN XL) 300 MG extended release tablet TAKE ONE TABLET BY MOUTH EVERY MORNING 30 tablet 2    Prenatal Vit-Fe Sulfate-FA-DHA (PRENATAL VITAMIN/MIN +DHA) 27-0.8-200 MG CAPS Take 1 tablet by mouth daily (may substitute any prenatal vitamin covered by insurance, ok for prenatal without DHA if DHA based prenatal is not covered) 90 capsule 3     No current facility-administered medications for this visit. No Known Allergies        Immunization History   Administered Date(s) Administered    COVID-19, PFIZER PURPLE top, DILUTE for use, (age 15 y+), 30mcg/0.3mL 2021, 2022    Influenza Virus Vaccine 2018, 2019    Tdap (Boostrix, Adacel) 10/20/2017       Review of Systems   Constitutional: Negative. Eyes: Negative. Respiratory: Negative. Cardiovascular: Negative. Gastrointestinal:  Positive for nausea. Endocrine: Positive for cold intolerance. Genitourinary: Negative. Musculoskeletal: Negative. Skin: Negative. Allergic/Immunologic: Negative. Neurological: Negative. Hematological: Negative. Psychiatric/Behavioral: Negative. /83 (Site: Right Upper Arm, Position: Sitting, Cuff Size: Medium Adult)   Ht 5' 9\" (1.753 m)   Wt 184 lb (83.5 kg)   LMP 11/12/2022 (Exact Date)   BMI 27.17 kg/m²     Physical Exam    Results for orders placed or performed in visit on 01/05/23   POCT urine pregnancy   Result Value Ref Range    Preg Test, Ur positive     Control         ASSESSMENT AND PLAN   Diagnosis Orders   1. Amenorrhea  POCT urine pregnancy    US OB LESS THAN 14 WEEKS SINGLE OR FIRST GESTATION      2. Nausea and vomiting, unspecified vomiting type  doxylamine-pyridoxine 10-10 MG TBEC          Return in about 2 weeks (around 1/19/2023).     Ellen Randall MD

## 2023-02-02 ENCOUNTER — INITIAL PRENATAL (OUTPATIENT)
Dept: OBGYN | Age: 28
End: 2023-02-02
Payer: COMMERCIAL

## 2023-02-02 VITALS — DIASTOLIC BLOOD PRESSURE: 89 MMHG | WEIGHT: 185 LBS | SYSTOLIC BLOOD PRESSURE: 133 MMHG | BODY MASS INDEX: 27.32 KG/M2

## 2023-02-02 DIAGNOSIS — Z34.91 NORMAL PREGNANCY, FIRST TRIMESTER: Primary | ICD-10-CM

## 2023-02-02 DIAGNOSIS — F32.A ANXIETY AND DEPRESSION: ICD-10-CM

## 2023-02-02 DIAGNOSIS — F41.9 ANXIETY AND DEPRESSION: ICD-10-CM

## 2023-02-02 DIAGNOSIS — R30.0 DYSURIA: ICD-10-CM

## 2023-02-02 DIAGNOSIS — Z3A.11 11 WEEKS GESTATION OF PREGNANCY: ICD-10-CM

## 2023-02-02 LAB
ABO/RH: NORMAL
ANTIBODY SCREEN: NORMAL

## 2023-02-02 PROCEDURE — 36415 COLL VENOUS BLD VENIPUNCTURE: CPT

## 2023-02-02 PROCEDURE — H1002 CARECOORDINATION PRENATAL: HCPCS

## 2023-02-02 PROCEDURE — H1000 PRENATAL CARE ATRISK ASSESSM: HCPCS

## 2023-02-02 PROCEDURE — 0502F SUBSEQUENT PRENATAL CARE: CPT

## 2023-02-02 SDOH — ECONOMIC STABILITY: INCOME INSECURITY: HOW HARD IS IT FOR YOU TO PAY FOR THE VERY BASICS LIKE FOOD, HOUSING, MEDICAL CARE, AND HEATING?: NOT HARD AT ALL

## 2023-02-02 SDOH — ECONOMIC STABILITY: HOUSING INSECURITY
IN THE LAST 12 MONTHS, WAS THERE A TIME WHEN YOU DID NOT HAVE A STEADY PLACE TO SLEEP OR SLEPT IN A SHELTER (INCLUDING NOW)?: NO

## 2023-02-02 SDOH — ECONOMIC STABILITY: FOOD INSECURITY: WITHIN THE PAST 12 MONTHS, THE FOOD YOU BOUGHT JUST DIDN'T LAST AND YOU DIDN'T HAVE MONEY TO GET MORE.: NEVER TRUE

## 2023-02-02 SDOH — ECONOMIC STABILITY: FOOD INSECURITY: WITHIN THE PAST 12 MONTHS, YOU WORRIED THAT YOUR FOOD WOULD RUN OUT BEFORE YOU GOT MONEY TO BUY MORE.: NEVER TRUE

## 2023-02-02 ASSESSMENT — ENCOUNTER SYMPTOMS
GASTROINTESTINAL NEGATIVE: 1
RESPIRATORY NEGATIVE: 1
ABDOMINAL PAIN: 0

## 2023-02-02 NOTE — PROGRESS NOTES
23    Archana Rowan  1995    Chief Complaint   Patient presents with    Initial Prenatal Visit     Pt here for NOB, labs and cultures to be drawn, pap done 9/15/22-neg, taking pnv. Pt c/o right side hip pain. Archana Rowan is a 28 y.o. female,  ,  LMP was Patient's last menstrual period was 2022 (exact date). , who presents for presents for prenatal care. A urine test was completed. Since her LMP she claims she has been without significant complaints. Recent dysuria, also intermittent hip pain that is currently resolved. Past History: This is her first pregnancy.       Past Medical History:   Diagnosis Date    Amenorrhea     Anxiety     Common bile duct stone     per old chart pt had gallbladder removed 2017-  had MRCP at [facility] 2017- dx with distal CBD stone- for ERCP 2017    Depression     GERD (gastroesophageal reflux disease)     Pregnant        Past Surgical History:   Procedure Laterality Date    CHOLECYSTECTOMY  2017    ERCP  2017    removal of CBD stone       Social History     Socioeconomic History    Marital status: Single     Spouse name: Not on file    Number of children: Not on file    Years of education: Not on file    Highest education level: Not on file   Occupational History    Occupation:      Comment: [address] [employer] services   Tobacco Use    Smoking status: Never    Smokeless tobacco: Never   Vaping Use    Vaping Use: Never used   Substance and Sexual Activity    Alcohol use: Not Currently     Comment: occasionally     Drug use: No    Sexual activity: Yes   Other Topics Concern    Not on file   Social History Narrative    Not on file     Social Determinants of Health     Financial Resource Strain: Low Risk     Difficulty of Paying Living Expenses: Not hard at all   Food Insecurity: No Food Insecurity    Worried About Running Out of Food in the Last Year: Never true    [address] in the Last Year: Never true Transportation Needs: Unknown    Lack of Transportation (Medical): Not on file    Lack of Transportation (Non-Medical): No   Physical Activity: Not on file   Stress: Not on file   Social Connections: Not on file   Intimate Partner Violence: Not on file   Housing Stability: Unknown    Unable to Pay for Housing in the Last Year: Not on file    Number of Places Lived in the Last Year: Not on file    Unstable Housing in the Last Year: No       Family History   Problem Relation Age of Onset    Diabetes Mother     High Blood Pressure Father        Current Outpatient Medications   Medication Sig Dispense Refill    doxylamine-pyridoxine 10-10 MG TBEC Take 1 tablet by mouth 3 times daily as needed (nause) 60 tablet 3    buPROPion (WELLBUTRIN XL) 300 MG extended release tablet TAKE ONE TABLET BY MOUTH EVERY MORNING 30 tablet 2    Prenatal Vit-Fe Sulfate-FA-DHA (PRENATAL VITAMIN/MIN +DHA) 27-0.8-200 MG CAPS Take 1 tablet by mouth daily (may substitute any prenatal vitamin covered by insurance, ok for prenatal without DHA if DHA based prenatal is not covered) 90 capsule 3     No current facility-administered medications for this visit. No Known Allergies        Immunization History   Administered Date(s) Administered    COVID-19, PFIZER PURPLE top, DILUTE for use, (age 15 y+), 30mcg/0.3mL 2021, 2022    Influenza Virus Vaccine 2018, 2019    Tdap (Boostrix, Adacel) 10/20/2017       Review of Systems   Constitutional: Negative. Negative for fatigue and fever. Respiratory: Negative. Gastrointestinal: Negative. Negative for abdominal pain. Endocrine: Negative. Genitourinary:  Positive for dysuria and menstrual problem. Negative for frequency, pelvic pain, vaginal bleeding, vaginal discharge and vaginal pain. Musculoskeletal:  Positive for arthralgias. Skin: Negative. Neurological: Negative. Negative for dizziness and headaches. Psychiatric/Behavioral: Negative.        BP 133/89   Wt 185 lb (83.9 kg)   LMP 11/12/2022 (Exact Date)   BMI 27.32 kg/m²     Physical Exam  Vitals and nursing note reviewed. Constitutional:       General: She is not in acute distress. Appearance: Normal appearance. She is normal weight. HENT:      Head: Normocephalic and atraumatic. Pulmonary:      Effort: Pulmonary effort is normal. No respiratory distress. Musculoskeletal:         General: Normal range of motion. Cervical back: Normal range of motion. Skin:     General: Skin is warm and dry. Neurological:      General: No focal deficit present. Mental Status: She is alert and oriented to person, place, and time. Psychiatric:         Mood and Affect: Mood normal.         Speech: Speech normal.         Behavior: Behavior normal. Behavior is cooperative. Thought Content: Thought content normal.     FHR + by doppler    No results found for this visit on 02/02/23. ASSESSMENT AND PLAN   Diagnosis Orders   1. Normal pregnancy, first trimester  C.trachomatis N.gonorrhoeae DNA, Urine    US OB 14 PLUS WEEKS SINGLE OR FIRST GESTATION    Obstetric panel    HIV Screen    Hepatitis C RNA QNT W Genotype RFLX    Culture, Urine      2. 11 weeks gestation of pregnancy        3. Anxiety and depression        4. Dysuria          Labs, g/c urine, pt desires materni 21 & carrier screens, u/s 9 weeks, prenatal 4 weeks    Pt encouraged to mention any worsening anxiety/depressive symptoms throughout pregnancy, she verbalizes understanding    General pregnancy info reviewed, all questions and concerns addressed. Bleeding and pain precautions reviewed as well.     Return in about 4 weeks (around 3/2/2023) for routine prenatal.    Curtis Saldivar PA-C

## 2023-02-03 LAB
BASOPHILS ABSOLUTE: 0 K/UL (ref 0–0.2)
BASOPHILS RELATIVE PERCENT: 0.5 %
C. TRACHOMATIS DNA ,URINE: NEGATIVE
EOSINOPHILS ABSOLUTE: 0 K/UL (ref 0–0.6)
EOSINOPHILS RELATIVE PERCENT: 0.4 %
HCT VFR BLD CALC: 41.3 % (ref 36–48)
HEMOGLOBIN: 14.1 G/DL (ref 12–16)
HEPATITIS B SURFACE ANTIGEN INTERPRETATION: NORMAL
HIV AG/AB: NORMAL
HIV ANTIGEN: NORMAL
HIV-1 ANTIBODY: NORMAL
HIV-2 AB: NORMAL
LYMPHOCYTES ABSOLUTE: 1.6 K/UL (ref 1–5.1)
LYMPHOCYTES RELATIVE PERCENT: 20.6 %
MCH RBC QN AUTO: 31 PG (ref 26–34)
MCHC RBC AUTO-ENTMCNC: 34.2 G/DL (ref 31–36)
MCV RBC AUTO: 90.7 FL (ref 80–100)
MONOCYTES ABSOLUTE: 0.5 K/UL (ref 0–1.3)
MONOCYTES RELATIVE PERCENT: 6 %
N. GONORRHOEAE DNA, URINE: NEGATIVE
NEUTROPHILS ABSOLUTE: 5.8 K/UL (ref 1.7–7.7)
NEUTROPHILS RELATIVE PERCENT: 72.5 %
PDW BLD-RTO: 12.9 % (ref 12.4–15.4)
PLATELET # BLD: 276 K/UL (ref 135–450)
PMV BLD AUTO: 8.7 FL (ref 5–10.5)
RBC # BLD: 4.55 M/UL (ref 4–5.2)
RUBELLA ANTIBODY IGG: 76.7 IU/ML
TOTAL SYPHILLIS IGG/IGM: NORMAL
URINE CULTURE, ROUTINE: NORMAL
WBC # BLD: 8 K/UL (ref 4–11)

## 2023-02-04 LAB
HCV QNT BY NAAT IU/ML: NOT DETECTED IU/ML
HCV QNT BY NAAT LOG IU/ML: NOT DETECTED LOG IU/ML
INTERPRETATION: NOT DETECTED

## 2023-02-10 ENCOUNTER — HOSPITAL ENCOUNTER (OUTPATIENT)
Age: 28
Discharge: HOME | End: 2023-02-10
Attending: OBSTETRICS & GYNECOLOGY | Admitting: OBSTETRICS & GYNECOLOGY
Payer: COMMERCIAL

## 2023-02-10 VITALS
TEMPERATURE: 98.3 F | WEIGHT: 183 LBS | DIASTOLIC BLOOD PRESSURE: 67 MMHG | HEART RATE: 88 BPM | RESPIRATION RATE: 16 BRPM | SYSTOLIC BLOOD PRESSURE: 107 MMHG | OXYGEN SATURATION: 99 % | HEIGHT: 69 IN | BODY MASS INDEX: 27.11 KG/M2

## 2023-02-10 PROBLEM — O20.9 BLEEDING IN EARLY PREGNANCY: Status: ACTIVE | Noted: 2023-02-10

## 2023-02-10 LAB
ABO/RH: NORMAL
AMPHETAMINES: NEGATIVE
ANTIBODY SCREEN: NEGATIVE
BACTERIA: NEGATIVE /HPF
BARBITURATE SCREEN URINE: NEGATIVE
BENZODIAZEPINE SCREEN, URINE: NEGATIVE
BILIRUBIN URINE: NEGATIVE MG/DL
BLOOD, URINE: NEGATIVE
CANNABINOID SCREEN URINE: NEGATIVE
CLARITY: CLEAR
COCAINE METABOLITE: NEGATIVE
COLOR: YELLOW
GLUCOSE, URINE: NEGATIVE MG/DL
KETONES, URINE: NEGATIVE MG/DL
LEUKOCYTE ESTERASE, URINE: ABNORMAL
MUCUS: ABNORMAL HPF
NITRITE URINE, QUANTITATIVE: NEGATIVE
OPIATES, URINE: NEGATIVE
OXYCODONE: NEGATIVE
PH, URINE: 8 (ref 5–8)
PHENCYCLIDINE, URINE: NEGATIVE
PROTEIN UA: NEGATIVE MG/DL
RBC URINE: ABNORMAL /HPF (ref 0–6)
SPECIFIC GRAVITY UA: 1.01 (ref 1–1.03)
SQUAMOUS EPITHELIAL: 9 /HPF
TRICHOMONAS: ABNORMAL /HPF
UROBILINOGEN, URINE: 0.2 MG/DL (ref 0.2–1)
WBC UA: <1 /HPF (ref 0–5)

## 2023-02-10 PROCEDURE — 86901 BLOOD TYPING SEROLOGIC RH(D): CPT

## 2023-02-10 PROCEDURE — 81001 URINALYSIS AUTO W/SCOPE: CPT

## 2023-02-10 PROCEDURE — 86850 RBC ANTIBODY SCREEN: CPT

## 2023-02-10 PROCEDURE — 80307 DRUG TEST PRSMV CHEM ANLYZR: CPT

## 2023-02-10 PROCEDURE — 86900 BLOOD TYPING SEROLOGIC ABO: CPT

## 2023-02-10 PROCEDURE — 6360000002 HC RX W HCPCS

## 2023-02-10 PROCEDURE — 99203 OFFICE O/P NEW LOW 30 MIN: CPT

## 2023-02-10 RX ADMIN — HUMAN RHO(D) IMMUNE GLOBULIN 300 MCG: 300 INJECTION, SOLUTION INTRAMUSCULAR at 17:41

## 2023-02-10 NOTE — FLOWSHEET NOTE
Patient presents to the [facility] ambulatory with concerns of bleeding, shown to room lt-02, oriented to room, instructed on ccms u/a and to change into gown.

## 2023-02-10 NOTE — FLOWSHEET NOTE
Patient reports this afternoon, after having a bm she noticed some bright red blood when she was wiping, the blood was from her vagina. There was quite a bit on the toilet paper but when she went back and wiped there was only a little. Patient reports there has been no other since then. Patient reports she did have some spotting one other time around [facility][1]. Patient denies abd cramping. Abd non tender to touch. Patient denies intercourse in the past 24 hours. Patient reports that not today but a few days ago she was having some burning during urination.

## 2023-02-10 NOTE — DISCHARGE INSTRUCTIONS
OB DISCHARGE INSTRUCTIONS  2/10/2023 @ 6:11 PM     Discharge Disposition: *** Home *** May take Tylenol for pain.  Labor  YES  Regular tightening of the uterus coming as often as once every 15 minutes. yes Menstrual-like cramps, they may be regular, or may be continuous and move to your back. YES A low, dull backache different from what you normally experience. It may come and go. YES Vaginal leaking of fluid. YES Any bleeding from your vagina. YES Pain, burning or bleeding when you urinate. Labor  Call your doctor; or come to the hospital, if you have:  YES Contractions coming about every 5 minutes, for about one hour. Labor contractions are usually strong enough that you cannot walk or talk while having contractions. (Contractions can feel like menstrual cramps, tightening in your abdomen, rectal pressure or a backache. This feeling comes and goes.)   YES Vaginal leaking of fluid. YES Heavy, bright red bleeding, like the heavy days of your period.  (A little bloody show is normal in labor.)     Always call your Doctor if you:  YES Notice decreased movement of your baby, different from what you are used to. YES Have heavy, bright red bleeding, like the heavy days of your period. YES Have vaginal leaking of watery fluid. Your next appointment:___Keep your next appointment as sceduled and return to L & D if needed. _________________    Activity:  AS TOLERATED  Diet:  REGULAR  If you have any questions regarding your discharge instructions call us at [phone]  .

## 2023-02-10 NOTE — PROGRESS NOTES
Department of Obstetrics and Gynecology  Labor and Delivery  TRIAGE NOTE      SUBJECTIVE:    Chief Complaint   Patient presents with    Vaginal Bleeding     Pt here for vaginal bleeding in pregnancy. Pt states she was having a BM and wiped and noticed some bright red bleeding on the toilet paper. Pt states she felt down by her bottom and vaginal area and there were no hemorrhoids. OBJECTIVE    Vitals:  /73   Pulse (!) 107   Temp 98.3 °F (36.8 °C) (Oral)   Resp 16   Ht 5' 9\" (1.753 m)   Wt 183 lb (83 kg)   LMP 2022 (Exact Date)   SpO2 99%   BMI 27.02 kg/m²       CONSTITUTIONAL:  negative  RESPIRATORY:  negative  CARDIOVASCULAR:  negative  GASTROINTESTINAL:  negative  ALLERGIC/IMMUNOLOGIC:  negative  NEUROLOGICAL:  negative  BEHAVIOR/PSYCH:  negative    Cervix:    Declined per pt       Fetal heart rate: 165, Active fetus viewed on Ultrasound       Blood Type- A -       ASSESSMENT:     28y.o.  year old  at 14w9d  with 2023, by Last Menstrual Period  Pt had some bright red bleeding after having a Bm today. States she did strain a lot while having a BM. Pt denies any recent intercourse. States she felt down by her rectum and vaginal area and did not feel a hemorrhoid and states the bleeding was from her vagina. The bleeding has stopped now. Pt had declined SSE and SVE. Will send UA and treat as needed. Pt is A- will give rhogam today. PLAN:   Will give rhogam today in triage   Discharge pt home   Follow up in office for routine OB appointment. I have collaborated and updated Dr Castellanos  and the MD agrees with the current POC.        Freddie Meraz, ELIZABETH - DAVID
done

## 2023-02-11 LAB
COMPONENT: NORMAL
STATUS: NORMAL
TRANSFUSION STATUS: NORMAL
UNIT DIVISION: 0
UNIT NUMBER: NORMAL

## 2023-02-11 NOTE — FLOWSHEET NOTE
Patient left the birthing center ambulatory after receiving discharge instructions. Patient voiced understanding without any further questions at this time.

## 2023-03-02 ENCOUNTER — ROUTINE PRENATAL (OUTPATIENT)
Dept: OBGYN | Age: 28
End: 2023-03-02

## 2023-03-02 VITALS — DIASTOLIC BLOOD PRESSURE: 83 MMHG | SYSTOLIC BLOOD PRESSURE: 120 MMHG | WEIGHT: 186 LBS | BODY MASS INDEX: 27.47 KG/M2

## 2023-03-02 DIAGNOSIS — Z34.02 ENCOUNTER FOR SUPERVISION OF NORMAL FIRST PREGNANCY IN SECOND TRIMESTER: Primary | ICD-10-CM

## 2023-03-02 DIAGNOSIS — Z3A.15 15 WEEKS GESTATION OF PREGNANCY: ICD-10-CM

## 2023-03-02 PROCEDURE — 0502F SUBSEQUENT PRENATAL CARE: CPT | Performed by: OBSTETRICS & GYNECOLOGY

## 2023-03-02 NOTE — PROGRESS NOTES
Return OB Office Visit    CC:   Chief Complaint   Patient presents with    Routine Prenatal Visit     No concerns        HPI:  Patient seen and examined. No concerns/complaints. Denies VB, LOF, ctx. No Fm. Denies headaches, vision changes, RUQ pain, increased LE edema. Denies chest pain, shortness of breath, fever, chills, nausea, vomiting. Review of Systems: The following ROS was otherwise negative, except as noted in the HPI: constitutional, HEENT, respiratory, cardiovascular, gastrointestinal, genitourinary, skin, musculoskeletal, neurological, psych    Objective:  /83   Wt 186 lb (84.4 kg)   LMP 2022 (Exact Date)   BMI 27.47 kg/m²     Gravid, non tender, no flank pain    FHR: +by doppler    Assessment/Plan:   King Brooks is a 28 y.o.  at 15w5d who presents for routine OB visit     Diagnosis Orders   1. Encounter for supervision of normal first pregnancy in second trimester        2. 15 weeks gestation of pregnancy          Bleeding precautions    Return in about 4 weeks (around 3/30/2023).       Dian Baez MD

## 2023-03-03 DIAGNOSIS — F32.A ANXIETY AND DEPRESSION: ICD-10-CM

## 2023-03-03 DIAGNOSIS — F41.9 ANXIETY AND DEPRESSION: ICD-10-CM

## 2023-03-03 RX ORDER — BUPROPION HYDROCHLORIDE 300 MG/1
TABLET ORAL
Qty: 30 TABLET | Refills: 2 | OUTPATIENT
Start: 2023-03-03

## 2023-03-30 ENCOUNTER — ROUTINE PRENATAL (OUTPATIENT)
Dept: OBGYN | Age: 28
End: 2023-03-30

## 2023-03-30 VITALS
BODY MASS INDEX: 27.55 KG/M2 | DIASTOLIC BLOOD PRESSURE: 80 MMHG | SYSTOLIC BLOOD PRESSURE: 126 MMHG | HEIGHT: 69 IN | WEIGHT: 186 LBS

## 2023-03-30 DIAGNOSIS — Z34.02 ENCOUNTER FOR SUPERVISION OF NORMAL FIRST PREGNANCY IN SECOND TRIMESTER: Primary | ICD-10-CM

## 2023-03-30 DIAGNOSIS — Z3A.19 19 WEEKS GESTATION OF PREGNANCY: ICD-10-CM

## 2023-03-30 PROCEDURE — 0502F SUBSEQUENT PRENATAL CARE: CPT

## 2023-03-30 NOTE — PROGRESS NOTES
Return OB Office Visit    CC:   Chief Complaint   Patient presents with    Routine Prenatal Visit     Pt has questions regarding weight gain. HPI:  Patient seen and examined. No concerns/complaints. Denies VB, LOF, ctx. +Fm. Denies headaches, vision changes, RUQ pain, increased LE edema. Denies chest pain, shortness of breath, fever, chills, nausea, vomiting. Pt is wondering if it's okay that she has gained little weight so far in pregnancy. She denies abnormal eating habits or excessive exercise. Review of Systems: The following ROS was otherwise negative, except as noted in the HPI: constitutional, HEENT, respiratory, cardiovascular, gastrointestinal, genitourinary, skin, musculoskeletal, neurological, psych    Objective:  /80 (Site: Right Upper Arm, Position: Sitting, Cuff Size: Medium Adult)   Ht 5' 9\" (1.753 m)   Wt 186 lb (84.4 kg)   LMP 2022 (Exact Date)   BMI 27.47 kg/m²     Physical Exam  Vitals and nursing note reviewed. Constitutional:       General: She is not in acute distress. Appearance: Normal appearance. She is normal weight. HENT:      Head: Normocephalic and atraumatic. Pulmonary:      Effort: Pulmonary effort is normal. No respiratory distress. Musculoskeletal:         General: Normal range of motion. Cervical back: Normal range of motion. Skin:     General: Skin is warm and dry. Neurological:      General: No focal deficit present. Mental Status: She is alert and oriented to person, place, and time. Psychiatric:         Mood and Affect: Mood normal.         Speech: Speech normal.         Behavior: Behavior normal. Behavior is cooperative. Thought Content: Thought content normal.       Gravid, non tender, no flank pain    FHR: + by doppler    Assessment/Plan:   Shaan Head is a 28 y.o.  at 19w5d who presents for routine OB visit     Diagnosis Orders   1.  Encounter for supervision of normal first pregnancy in second

## 2023-04-24 ENCOUNTER — OFFICE VISIT (OUTPATIENT)
Dept: INTERNAL MEDICINE CLINIC | Age: 28
End: 2023-04-24
Payer: COMMERCIAL

## 2023-04-24 VITALS
BODY MASS INDEX: 27.96 KG/M2 | HEIGHT: 69 IN | OXYGEN SATURATION: 98 % | HEART RATE: 107 BPM | RESPIRATION RATE: 20 BRPM | WEIGHT: 188.8 LBS | SYSTOLIC BLOOD PRESSURE: 122 MMHG | DIASTOLIC BLOOD PRESSURE: 70 MMHG

## 2023-04-24 DIAGNOSIS — K21.9 GASTROESOPHAGEAL REFLUX DISEASE WITHOUT ESOPHAGITIS: ICD-10-CM

## 2023-04-24 DIAGNOSIS — F32.A ANXIETY AND DEPRESSION: Primary | ICD-10-CM

## 2023-04-24 DIAGNOSIS — F41.9 ANXIETY AND DEPRESSION: Primary | ICD-10-CM

## 2023-04-24 PROCEDURE — 99214 OFFICE O/P EST MOD 30 MIN: CPT | Performed by: NURSE PRACTITIONER

## 2023-04-24 RX ORDER — FAMOTIDINE 20 MG/1
20 TABLET, FILM COATED ORAL 2 TIMES DAILY
Qty: 60 TABLET | Refills: 3 | Status: SHIPPED | OUTPATIENT
Start: 2023-04-24

## 2023-04-24 RX ORDER — BUPROPION HYDROCHLORIDE 300 MG/1
300 TABLET ORAL EVERY MORNING
Qty: 30 TABLET | Refills: 3 | Status: SHIPPED | OUTPATIENT
Start: 2023-04-24

## 2023-04-24 ASSESSMENT — ENCOUNTER SYMPTOMS
APNEA: 0
SINUS PRESSURE: 0
COUGH: 0
DIARRHEA: 0
COLOR CHANGE: 0
ABDOMINAL PAIN: 0
NAUSEA: 0
SINUS PAIN: 0
VOMITING: 0
SHORTNESS OF BREATH: 0
CHEST TIGHTNESS: 0

## 2023-04-24 NOTE — PROGRESS NOTES
Kim Needle  1995  04/24/23    Chief Complaint   Patient presents with    Medication Refill       SUBJECTIVE:      Patient's depression is reasonably well controlled with current treatment. Patient denies any suicidal ideation, homicidal ideation, hallucinations. She is currently 23 weeks pregnant and follows with Dr Hannah Lilly. Pt states that her OBGYN team is aware of her current Wellbutrin therapy and confirms this is okay by them to continue during pregnancy. GERD symptoms have been much worse during pregnancy, needing TUMS daily. . Patient denies any blood in stool black stool. Denies issues with frequent coughing or reflux while sleeping. Able to eat and drink appropriately without complications. Review of Systems   Constitutional:  Negative for activity change, appetite change, fatigue and fever. HENT:  Negative for congestion, nosebleeds, sinus pressure and sinus pain. Respiratory:  Negative for apnea, cough, chest tightness and shortness of breath. Cardiovascular:  Negative for chest pain and palpitations. Gastrointestinal:  Negative for abdominal pain, diarrhea, nausea and vomiting. Genitourinary:  Negative for difficulty urinating, flank pain and hematuria. Musculoskeletal:  Negative for arthralgias, joint swelling and myalgias. Skin:  Negative for color change and rash. Neurological:  Negative for dizziness, light-headedness and headaches. Psychiatric/Behavioral: Negative. Negative for behavioral problems. OBJECTIVE:    /70   Pulse (!) 107   Resp 20   Ht 5' 9\" (1.753 m)   Wt 188 lb 12.8 oz (85.6 kg)   LMP 11/12/2022 (Exact Date)   SpO2 98%   BMI 27.88 kg/m²     Physical Exam  Constitutional:       General: She is not in acute distress. Appearance: She is well-developed. She is not diaphoretic. HENT:      Head: Normocephalic and atraumatic. Cardiovascular:      Rate and Rhythm: Normal rate and regular rhythm.       Heart sounds: Normal heart

## 2023-04-27 ENCOUNTER — ROUTINE PRENATAL (OUTPATIENT)
Dept: OBGYN | Age: 28
End: 2023-04-27

## 2023-04-27 VITALS
HEIGHT: 69 IN | DIASTOLIC BLOOD PRESSURE: 76 MMHG | WEIGHT: 189 LBS | BODY MASS INDEX: 27.99 KG/M2 | SYSTOLIC BLOOD PRESSURE: 129 MMHG

## 2023-04-27 DIAGNOSIS — F32.A ANXIETY AND DEPRESSION: ICD-10-CM

## 2023-04-27 DIAGNOSIS — Z34.02 ENCOUNTER FOR SUPERVISION OF NORMAL FIRST PREGNANCY IN SECOND TRIMESTER: Primary | ICD-10-CM

## 2023-04-27 DIAGNOSIS — Z3A.23 23 WEEKS GESTATION OF PREGNANCY: ICD-10-CM

## 2023-04-27 DIAGNOSIS — F41.9 ANXIETY AND DEPRESSION: ICD-10-CM

## 2023-04-27 PROCEDURE — 0502F SUBSEQUENT PRENATAL CARE: CPT

## 2023-04-27 NOTE — PROGRESS NOTES
Return OB Office Visit    CC:   Chief Complaint   Patient presents with    Routine Prenatal Visit     No c/o. HPI:  Patient seen and examined. No concerns/complaints. Denies VB, LOF, ctx. +Fm. Denies headaches, vision changes, RUQ pain, increased LE edema. Denies chest pain, shortness of breath, fever, chills, nausea, vomiting. Review of Systems: The following ROS was otherwise negative, except as noted in the HPI: constitutional, HEENT, respiratory, cardiovascular, gastrointestinal, genitourinary, skin, musculoskeletal, neurological, psych    Objective:  /76 (Site: Right Upper Arm, Position: Sitting, Cuff Size: Medium Adult)   Ht 5' 9\" (1.753 m)   Wt 189 lb (85.7 kg)   LMP 2022 (Exact Date)   BMI 27.91 kg/m²     Physical Exam  Vitals and nursing note reviewed. Constitutional:       General: She is not in acute distress. Appearance: Normal appearance. She is normal weight. HENT:      Head: Normocephalic and atraumatic. Pulmonary:      Effort: Pulmonary effort is normal. No respiratory distress. Musculoskeletal:         General: Normal range of motion. Cervical back: Normal range of motion. Skin:     General: Skin is warm and dry. Neurological:      General: No focal deficit present. Mental Status: She is alert and oriented to person, place, and time. Psychiatric:         Mood and Affect: Mood normal.         Speech: Speech normal.         Behavior: Behavior normal. Behavior is cooperative. Thought Content: Thought content normal.       Gravid, non tender, no flank pain    FHR: + by doppler    Assessment/Plan:   Janak Gómez is a 28 y.o.  at 23w5d who presents for routine OB visit     Diagnosis Orders   1. Encounter for supervision of normal first pregnancy in second trimester        2. 23 weeks gestation of pregnancy        3. Anxiety and depression            All up-to-date obstetric labwork and imaging reviewed for today's encounter.

## 2023-05-25 ENCOUNTER — ROUTINE PRENATAL (OUTPATIENT)
Dept: OBGYN | Age: 28
End: 2023-05-25
Payer: COMMERCIAL

## 2023-05-25 VITALS — SYSTOLIC BLOOD PRESSURE: 116 MMHG | WEIGHT: 193.2 LBS | DIASTOLIC BLOOD PRESSURE: 88 MMHG | BODY MASS INDEX: 28.53 KG/M2

## 2023-05-25 DIAGNOSIS — Z3A.27 27 WEEKS GESTATION OF PREGNANCY: ICD-10-CM

## 2023-05-25 DIAGNOSIS — O26.13 INSUFFICIENT WEIGHT GAIN DURING PREGNANCY IN THIRD TRIMESTER: ICD-10-CM

## 2023-05-25 DIAGNOSIS — Z34.02 ENCOUNTER FOR SUPERVISION OF NORMAL FIRST PREGNANCY IN SECOND TRIMESTER: Primary | ICD-10-CM

## 2023-05-25 LAB
DEPRECATED RDW RBC AUTO: 13.5 % (ref 12.4–15.4)
GLUCOSE 1H P 50 G GLC PO SERPL-MCNC: 145 MG/DL
HCT VFR BLD AUTO: 37.1 % (ref 36–48)
HGB BLD-MCNC: 12.8 G/DL (ref 12–16)
MCH RBC QN AUTO: 32.3 PG (ref 26–34)
MCHC RBC AUTO-ENTMCNC: 34.6 G/DL (ref 31–36)
MCV RBC AUTO: 93.3 FL (ref 80–100)
PLATELET # BLD AUTO: 230 K/UL (ref 135–450)
PMV BLD AUTO: 8.8 FL (ref 5–10.5)
RBC # BLD AUTO: 3.97 M/UL (ref 4–5.2)
WBC # BLD AUTO: 7.3 K/UL (ref 4–11)

## 2023-05-25 PROCEDURE — 0502F SUBSEQUENT PRENATAL CARE: CPT | Performed by: OBSTETRICS & GYNECOLOGY

## 2023-05-25 PROCEDURE — 36415 COLL VENOUS BLD VENIPUNCTURE: CPT | Performed by: OBSTETRICS & GYNECOLOGY

## 2023-05-25 NOTE — PROGRESS NOTES
Return OB Office Visit    CC:   Chief Complaint   Patient presents with    Routine Prenatal Visit     Pt c/o chest pain x 1 day x 1 1/2 wks ago. 1 hr gtt today. Rhogam given . ed    Injections     Pt given rhogam today, Right Deltoid. Lot # [id number], exp-2025, [id number]       HPI:  Patient seen and examined. No concerns/complaints. Denies VB, LOF, ctx. +Fm. Denies headaches, vision changes, RUQ pain, increased LE edema. Denies chest pain, shortness of breath, fever, chills, nausea, vomiting. Review of Systems: The following ROS was otherwise negative, except as noted in the HPI: constitutional, HEENT, respiratory, cardiovascular, gastrointestinal, genitourinary, skin, musculoskeletal, neurological, psych    Objective:  /88   Wt 193 lb 3.2 oz (87.6 kg)   LMP 2022 (Exact Date)   BMI 28.53 kg/m²     Gravid, non tender, no flank pain    FHR: +by doppler    Assessment/Plan:   Angela Berry is a 28 y.o.  at 33w11d who presents for routine OB visit     Diagnosis Orders   1. Encounter for supervision of normal first pregnancy in second trimester  rho(D) immune globulin (HYPERRHO;RHOGAM) injection 300 mcg      2. 27 weeks gestation of pregnancy  rho(D) immune globulin (HYPERRHO;RHOGAM) injection 300 mcg      3. Insufficient weight gain during pregnancy in third trimester  US PREG UTERUS FOLLOW UP TRANSABD PER FETUS      Fkcs, labor precautions    Discussed 7# weight gain, discussed diet in pregnancy, check us at 32 weeks    Return in about 2 weeks (around 2023). All OB labs and imaging reviewed  Vitamins for the duration of pregnancy    Okay for episodic Tylenol usage during pregnancy. I do not recommend routine chronic Tylenol use in pregnancy however.     Rene Smith MD

## 2023-05-26 LAB — REAGIN+T PALLIDUM IGG+IGM SERPL-IMP: NORMAL

## 2023-06-01 ENCOUNTER — NURSE ONLY (OUTPATIENT)
Dept: OBGYN | Age: 28
End: 2023-06-01

## 2023-06-01 DIAGNOSIS — O99.810 ABNORMAL MATERNAL GLUCOSE TOLERANCE, ANTEPARTUM: Primary | ICD-10-CM

## 2023-06-01 LAB
GLUCOSE 1H P 100 G GLC PO SERPL-MCNC: 184 MG/DL
GLUCOSE 2H P 100 G GLC PO SERPL-MCNC: 140 MG/DL
GLUCOSE 3H P 100 G GLC PO SERPL-MCNC: 119 MG/DL
GLUCOSE BS SERPL-MCNC: 81 MG/DL

## 2023-06-08 ENCOUNTER — ROUTINE PRENATAL (OUTPATIENT)
Dept: OBGYN | Age: 28
End: 2023-06-08

## 2023-06-08 VITALS
SYSTOLIC BLOOD PRESSURE: 112 MMHG | BODY MASS INDEX: 28.58 KG/M2 | WEIGHT: 193 LBS | DIASTOLIC BLOOD PRESSURE: 65 MMHG | HEIGHT: 69 IN

## 2023-06-08 DIAGNOSIS — O09.93 HIGH RISK FOR INTRAPARTUM COMPLICATIONS IN THIRD TRIMESTER: ICD-10-CM

## 2023-06-08 DIAGNOSIS — Z3A.29 29 WEEKS GESTATION OF PREGNANCY: ICD-10-CM

## 2023-06-08 DIAGNOSIS — O26.13 INSUFFICIENT WEIGHT GAIN DURING PREGNANCY IN THIRD TRIMESTER: Primary | ICD-10-CM

## 2023-06-22 ENCOUNTER — ROUTINE PRENATAL (OUTPATIENT)
Dept: OBGYN | Age: 28
End: 2023-06-22

## 2023-06-22 ENCOUNTER — HOSPITAL ENCOUNTER (OUTPATIENT)
Age: 28
Discharge: HOME | End: 2023-06-22
Attending: OBSTETRICS & GYNECOLOGY | Admitting: OBSTETRICS & GYNECOLOGY
Payer: COMMERCIAL

## 2023-06-22 VITALS
OXYGEN SATURATION: 99 % | TEMPERATURE: 98 F | HEART RATE: 109 BPM | SYSTOLIC BLOOD PRESSURE: 128 MMHG | DIASTOLIC BLOOD PRESSURE: 90 MMHG | RESPIRATION RATE: 16 BRPM

## 2023-06-22 VITALS — WEIGHT: 192 LBS | BODY MASS INDEX: 28.35 KG/M2 | DIASTOLIC BLOOD PRESSURE: 95 MMHG | SYSTOLIC BLOOD PRESSURE: 136 MMHG

## 2023-06-22 DIAGNOSIS — O13.3 GESTATIONAL HYPERTENSION WITHOUT SIGNIFICANT PROTEINURIA IN THIRD TRIMESTER: Primary | ICD-10-CM

## 2023-06-22 DIAGNOSIS — Z3A.31 31 WEEKS GESTATION OF PREGNANCY: ICD-10-CM

## 2023-06-22 DIAGNOSIS — O09.93 SUPERVISION OF HIGH RISK PREGNANCY IN THIRD TRIMESTER: ICD-10-CM

## 2023-06-22 LAB
ALBUMIN SERPL-MCNC: 3.7 GM/DL (ref 3.4–5)
ALP BLD-CCNC: 146 IU/L (ref 40–128)
ALT SERPL-CCNC: 30 U/L (ref 10–40)
ANION GAP SERPL CALCULATED.3IONS-SCNC: 16 MMOL/L (ref 4–16)
AST SERPL-CCNC: 22 IU/L (ref 15–37)
BACTERIA: NEGATIVE /HPF
BASOPHILS ABSOLUTE: 0 K/CU MM
BASOPHILS RELATIVE PERCENT: 0.3 % (ref 0–1)
BILIRUB SERPL-MCNC: 0.3 MG/DL (ref 0–1)
BILIRUBIN URINE: NEGATIVE MG/DL
BLOOD, URINE: NEGATIVE
BUN SERPL-MCNC: 6 MG/DL (ref 6–23)
CALCIUM OXALATE CRYSTALS: ABNORMAL /HPF
CALCIUM SERPL-MCNC: 8.9 MG/DL (ref 8.3–10.6)
CHLORIDE BLD-SCNC: 104 MMOL/L (ref 99–110)
CLARITY: CLEAR
CO2: 17 MMOL/L (ref 21–32)
COLOR: YELLOW
CREAT SERPL-MCNC: 0.6 MG/DL (ref 0.6–1.1)
CREATININE URINE: 287.4 MG/DL (ref 28–217)
DIFFERENTIAL TYPE: ABNORMAL
EOSINOPHILS ABSOLUTE: 0 K/CU MM
EOSINOPHILS RELATIVE PERCENT: 0.3 % (ref 0–3)
GFR SERPL CREATININE-BSD FRML MDRD: >60 ML/MIN/1.73M2
GLUCOSE SERPL-MCNC: 108 MG/DL (ref 70–99)
GLUCOSE, URINE: NEGATIVE MG/DL
HCT VFR BLD CALC: 35 % (ref 37–47)
HEMOGLOBIN: 12.4 GM/DL (ref 12.5–16)
IMMATURE NEUTROPHIL %: 1 % (ref 0–0.43)
KETONES, URINE: ABNORMAL MG/DL
LEUKOCYTE ESTERASE, URINE: ABNORMAL
LYMPHOCYTES ABSOLUTE: 1.1 K/CU MM
LYMPHOCYTES RELATIVE PERCENT: 15.1 % (ref 24–44)
MCH RBC QN AUTO: 32.8 PG (ref 27–31)
MCHC RBC AUTO-ENTMCNC: 35.4 % (ref 32–36)
MCV RBC AUTO: 92.6 FL (ref 78–100)
MONOCYTES ABSOLUTE: 0.5 K/CU MM
MONOCYTES RELATIVE PERCENT: 7.5 % (ref 0–4)
MUCUS: ABNORMAL HPF
NITRITE URINE, QUANTITATIVE: NEGATIVE
NUCLEATED RBC %: 0 %
PDW BLD-RTO: 13.2 % (ref 11.7–14.9)
PH, URINE: 5.5 (ref 5–8)
PLATELET # BLD: 217 K/CU MM (ref 140–440)
PMV BLD AUTO: 9.8 FL (ref 7.5–11.1)
POTASSIUM SERPL-SCNC: 4 MMOL/L (ref 3.5–5.1)
PROT/CREAT RATIO, UR: 0.2
PROTEIN UA: ABNORMAL MG/DL
RBC # BLD: 3.78 M/CU MM (ref 4.2–5.4)
RBC URINE: 5 /HPF (ref 0–6)
SEGMENTED NEUTROPHILS ABSOLUTE COUNT: 5.5 K/CU MM
SEGMENTED NEUTROPHILS RELATIVE PERCENT: 75.8 % (ref 36–66)
SODIUM BLD-SCNC: 137 MMOL/L (ref 135–145)
SPECIFIC GRAVITY UA: >1.03 (ref 1–1.03)
SQUAMOUS EPITHELIAL: 6 /HPF
TOTAL IMMATURE NEUTOROPHIL: 0.07 K/CU MM
TOTAL NUCLEATED RBC: 0 K/CU MM
TOTAL PROTEIN: 6.1 GM/DL (ref 6.4–8.2)
TRICHOMONAS: ABNORMAL /HPF
URATE SERPL-MCNC: 4 MG/DL (ref 2.6–6)
URINE TOTAL PROTEIN: 50.9 MG/DL
UROBILINOGEN, URINE: 1 MG/DL (ref 0.2–1)
WBC # BLD: 7.2 K/CU MM (ref 4–10.5)
WBC UA: 6 /HPF (ref 0–5)

## 2023-06-22 PROCEDURE — 99213 OFFICE O/P EST LOW 20 MIN: CPT

## 2023-06-22 PROCEDURE — 84550 ASSAY OF BLOOD/URIC ACID: CPT

## 2023-06-22 PROCEDURE — 82570 ASSAY OF URINE CREATININE: CPT

## 2023-06-22 PROCEDURE — 85025 COMPLETE CBC W/AUTO DIFF WBC: CPT

## 2023-06-22 PROCEDURE — 81001 URINALYSIS AUTO W/SCOPE: CPT

## 2023-06-22 PROCEDURE — 84156 ASSAY OF PROTEIN URINE: CPT

## 2023-06-22 PROCEDURE — 80053 COMPREHEN METABOLIC PANEL: CPT

## 2023-06-22 NOTE — FLOWSHEET NOTE
Pt states sent by office for elevated BP, pt denies any headache, visual changes, or epigastric pain. Pt abdomen soft and non-tender to touch. Pt denies any other symptoms. Blood work and urine sent to lab.

## 2023-06-22 NOTE — DISCHARGE INSTRUCTIONS
OB DISCHARGE INSTRUCTIONS       Labor   Regular tightening of the uterus coming as often as once every 15 minutes. Menstrual-like cramps, they may be regular, or may be continuous and move to your back. A low, dull backache different from what you normally experience. It may come and go. Vaginal leaking of fluid. Any bleeding from your vagina. Pain, burning or bleeding when you urinate. Labor  Call your doctor; or come to the hospital, if you have:   Contractions coming about every 5 minutes, for about one hour. Labor contractions are usually strong enough that you cannot walk or talk while having contractions. (Contractions can feel like menstrual cramps, tightening in your abdomen, rectal pressure or a backache. This feeling comes and goes.)    Vaginal leaking of fluid. Heavy, bright red bleeding, like the heavy days of your period.  (A little bloody show is normal in labor.)     Always call your Doctor if you:   Notice decreased movement of your baby, different from what you are used to. Have heavy, bright red bleeding, like the heavy days of your period. Have vaginal leaking of watery fluid. Activity:  AS TOLERATED  Diet:  REGULAR  If you have any questions regarding your discharge instructions call us at [phone]  .

## 2023-06-22 NOTE — FLOWSHEET NOTE
Pt presents ambulatory to Labor & Delivery with a steady gait. Pt denies any vaginal bleeding, leaking of fluid, abdominal pain, or tenderness. Pt states she is feeling fetal movement. Pt oriented to room and call light. Call light within reach and bed in lowest position, with wheels locked. I will STOP taking the medications listed below when I get home from the hospital:  None

## 2023-06-22 NOTE — PROGRESS NOTES
Return OB Office Visit    CC:   Chief Complaint   Patient presents with    Routine Prenatal Visit     No c/o. ed       HPI:  Patient seen and examined. No concerns/complaints. Denies VB, LOF, ctx. +Fm. Denies headaches, vision changes, RUQ pain, increased LE edema. Denies chest pain, shortness of breath, fever, chills, nausea, vomiting. Review of Systems: The following ROS was otherwise negative, except as noted in the HPI: constitutional, HEENT, respiratory, cardiovascular, gastrointestinal, genitourinary, skin, musculoskeletal, neurological, psych    Objective:  BP (!) 136/95   Wt 192 lb (87.1 kg)   LMP 2022 (Exact Date)   BMI 28.35 kg/m²     Gravid, non tender, no flank pain    FHR: +by doppler    Assessment/Plan:   Rodney Jensen is a 28 y.o.  at 31w5d who presents for routine OB visit     Diagnosis Orders   1. Gestational hypertension without significant proteinuria in third trimester        2. Supervision of high risk pregnancy in third trimester        3. 31 weeks gestation of pregnancy          To [facility] for evaluation    Return in about 1 week (around 2023). All OB labs and imaging reviewed  Vitamins for the duration of pregnancy    Okay for episodic Tylenol usage during pregnancy. I do not recommend routine chronic Tylenol use in pregnancy however.     Ronnie Galan MD

## 2023-06-26 ENCOUNTER — ROUTINE PRENATAL (OUTPATIENT)
Dept: OBGYN | Age: 28
End: 2023-06-26
Payer: COMMERCIAL

## 2023-06-26 VITALS — SYSTOLIC BLOOD PRESSURE: 125 MMHG | BODY MASS INDEX: 28.65 KG/M2 | WEIGHT: 194 LBS | DIASTOLIC BLOOD PRESSURE: 81 MMHG

## 2023-06-26 DIAGNOSIS — O13.3 GESTATIONAL HYPERTENSION WITHOUT SIGNIFICANT PROTEINURIA IN THIRD TRIMESTER: Primary | ICD-10-CM

## 2023-06-26 LAB
ALT SERPL-CCNC: 30 U/L (ref 10–40)
AST SERPL-CCNC: 20 U/L (ref 15–37)
BASOPHILS # BLD: 0 K/UL (ref 0–0.2)
BASOPHILS NFR BLD: 0.2 %
CREAT SERPL-MCNC: 0.6 MG/DL (ref 0.6–1.1)
CREAT UR-MCNC: 219 MG/DL (ref 28–259)
DEPRECATED RDW RBC AUTO: 13.6 % (ref 12.4–15.4)
EOSINOPHIL # BLD: 0 K/UL (ref 0–0.6)
EOSINOPHIL NFR BLD: 0.3 %
GFR SERPLBLD CREATININE-BSD FMLA CKD-EPI: >60 ML/MIN/{1.73_M2}
HCT VFR BLD AUTO: 36 % (ref 36–48)
HGB BLD-MCNC: 13 G/DL (ref 12–16)
LYMPHOCYTES # BLD: 1.1 K/UL (ref 1–5.1)
LYMPHOCYTES NFR BLD: 17 %
MCH RBC QN AUTO: 33.5 PG (ref 26–34)
MCHC RBC AUTO-ENTMCNC: 36.1 G/DL (ref 31–36)
MCV RBC AUTO: 92.8 FL (ref 80–100)
MONOCYTES # BLD: 0.6 K/UL (ref 0–1.3)
MONOCYTES NFR BLD: 9.1 %
NEUTROPHILS # BLD: 4.9 K/UL (ref 1.7–7.7)
NEUTROPHILS NFR BLD: 73.4 %
PLATELET # BLD AUTO: 229 K/UL (ref 135–450)
PMV BLD AUTO: 8.9 FL (ref 5–10.5)
PROT UR-MCNC: 29 MG/DL
PROT/CREAT UR-RTO: 0.1 MG/DL
RBC # BLD AUTO: 3.87 M/UL (ref 4–5.2)
URATE SERPL-MCNC: 3.9 MG/DL (ref 2.6–6)
WBC # BLD AUTO: 6.7 K/UL (ref 4–11)

## 2023-06-26 PROCEDURE — 36415 COLL VENOUS BLD VENIPUNCTURE: CPT | Performed by: OBSTETRICS & GYNECOLOGY

## 2023-06-26 PROCEDURE — 0502F SUBSEQUENT PRENATAL CARE: CPT | Performed by: OBSTETRICS & GYNECOLOGY

## 2023-07-05 ENCOUNTER — ROUTINE PRENATAL (OUTPATIENT)
Dept: OBGYN | Age: 28
End: 2023-07-05

## 2023-07-05 VITALS — BODY MASS INDEX: 28.8 KG/M2 | WEIGHT: 195 LBS | SYSTOLIC BLOOD PRESSURE: 119 MMHG | DIASTOLIC BLOOD PRESSURE: 88 MMHG

## 2023-07-05 DIAGNOSIS — O13.3 GESTATIONAL HYPERTENSION WITHOUT SIGNIFICANT PROTEINURIA IN THIRD TRIMESTER: ICD-10-CM

## 2023-07-05 DIAGNOSIS — O09.93 SUPERVISION OF HIGH RISK PREGNANCY IN THIRD TRIMESTER: Primary | ICD-10-CM

## 2023-07-05 DIAGNOSIS — Z3A.33 33 WEEKS GESTATION OF PREGNANCY: ICD-10-CM

## 2023-07-05 PROCEDURE — 0502F SUBSEQUENT PRENATAL CARE: CPT | Performed by: OBSTETRICS & GYNECOLOGY

## 2023-07-20 ENCOUNTER — ROUTINE PRENATAL (OUTPATIENT)
Dept: OBGYN | Age: 28
End: 2023-07-20

## 2023-07-20 VITALS — SYSTOLIC BLOOD PRESSURE: 121 MMHG | BODY MASS INDEX: 28.94 KG/M2 | DIASTOLIC BLOOD PRESSURE: 77 MMHG | WEIGHT: 196 LBS

## 2023-07-20 DIAGNOSIS — Z3A.35 35 WEEKS GESTATION OF PREGNANCY: ICD-10-CM

## 2023-07-20 DIAGNOSIS — O13.3 GESTATIONAL HYPERTENSION WITHOUT SIGNIFICANT PROTEINURIA IN THIRD TRIMESTER: ICD-10-CM

## 2023-07-20 DIAGNOSIS — O09.93 SUPERVISION OF HIGH RISK PREGNANCY IN THIRD TRIMESTER: Primary | ICD-10-CM

## 2023-07-20 PROCEDURE — 0502F SUBSEQUENT PRENATAL CARE: CPT | Performed by: OBSTETRICS & GYNECOLOGY

## 2023-07-27 ENCOUNTER — ROUTINE PRENATAL (OUTPATIENT)
Dept: OBGYN | Age: 28
End: 2023-07-27

## 2023-07-27 VITALS — BODY MASS INDEX: 29.53 KG/M2 | SYSTOLIC BLOOD PRESSURE: 120 MMHG | WEIGHT: 200 LBS | DIASTOLIC BLOOD PRESSURE: 87 MMHG

## 2023-07-27 DIAGNOSIS — R12 HEARTBURN IN PREGNANCY IN THIRD TRIMESTER: ICD-10-CM

## 2023-07-27 DIAGNOSIS — O26.893 HEARTBURN IN PREGNANCY IN THIRD TRIMESTER: ICD-10-CM

## 2023-07-27 DIAGNOSIS — O09.93 SUPERVISION OF HIGH RISK PREGNANCY IN THIRD TRIMESTER: Primary | ICD-10-CM

## 2023-07-27 DIAGNOSIS — Z3A.36 36 WEEKS GESTATION OF PREGNANCY: ICD-10-CM

## 2023-07-27 PROCEDURE — 0502F SUBSEQUENT PRENATAL CARE: CPT | Performed by: OBSTETRICS & GYNECOLOGY

## 2023-07-27 RX ORDER — OMEPRAZOLE 20 MG/1
20 CAPSULE, DELAYED RELEASE ORAL
Qty: 30 CAPSULE | Refills: 1 | Status: SHIPPED | OUTPATIENT
Start: 2023-07-27

## 2023-07-30 LAB — GP B STREP SPEC QL CULT: NORMAL
